# Patient Record
Sex: FEMALE | Race: WHITE | NOT HISPANIC OR LATINO | URBAN - METROPOLITAN AREA
[De-identification: names, ages, dates, MRNs, and addresses within clinical notes are randomized per-mention and may not be internally consistent; named-entity substitution may affect disease eponyms.]

---

## 2021-05-17 ENCOUNTER — OUTPATIENT (OUTPATIENT)
Dept: OUTPATIENT SERVICES | Facility: HOSPITAL | Age: 66
LOS: 1 days | End: 2021-05-17

## 2021-06-04 ENCOUNTER — APPOINTMENT (OUTPATIENT)
Dept: DISASTER EMERGENCY | Facility: CLINIC | Age: 66
End: 2021-06-04

## 2021-06-07 ENCOUNTER — INPATIENT (INPATIENT)
Facility: HOSPITAL | Age: 66
LOS: 1 days | Discharge: HOME HEALTH | End: 2021-06-09

## 2021-06-07 ENCOUNTER — OUTPATIENT (OUTPATIENT)
Dept: OUTPATIENT SERVICES | Facility: HOSPITAL | Age: 66
LOS: 1 days | End: 2021-06-07

## 2021-06-08 ENCOUNTER — OUTPATIENT (OUTPATIENT)
Dept: OUTPATIENT SERVICES | Facility: HOSPITAL | Age: 66
LOS: 1 days | End: 2021-06-08

## 2021-06-09 ENCOUNTER — OUTPATIENT (OUTPATIENT)
Dept: OUTPATIENT SERVICES | Facility: HOSPITAL | Age: 66
LOS: 1 days | End: 2021-06-09

## 2022-05-20 PROBLEM — Z00.00 ENCOUNTER FOR PREVENTIVE HEALTH EXAMINATION: Status: ACTIVE | Noted: 2022-05-20

## 2023-01-10 ENCOUNTER — OFFICE VISIT (OUTPATIENT)
Dept: FAMILY MEDICINE CLINIC | Facility: CLINIC | Age: 68
End: 2023-01-10
Payer: MEDICARE

## 2023-01-10 VITALS
SYSTOLIC BLOOD PRESSURE: 140 MMHG | HEIGHT: 69 IN | WEIGHT: 211 LBS | TEMPERATURE: 97.7 F | BODY MASS INDEX: 31.25 KG/M2 | OXYGEN SATURATION: 96 % | DIASTOLIC BLOOD PRESSURE: 80 MMHG | HEART RATE: 77 BPM

## 2023-01-10 DIAGNOSIS — I10 PRIMARY HYPERTENSION: ICD-10-CM

## 2023-01-10 DIAGNOSIS — E03.9 HYPOTHYROIDISM, UNSPECIFIED TYPE: ICD-10-CM

## 2023-01-10 DIAGNOSIS — M70.71 BURSITIS OF RIGHT HIP, UNSPECIFIED BURSA: ICD-10-CM

## 2023-01-10 DIAGNOSIS — D64.9 ANEMIA, UNSPECIFIED TYPE: ICD-10-CM

## 2023-01-10 DIAGNOSIS — M25.50 ARTHRALGIA, UNSPECIFIED JOINT: ICD-10-CM

## 2023-01-10 DIAGNOSIS — Z87.42 HISTORY OF ABNORMAL CERVICAL PAP SMEAR: ICD-10-CM

## 2023-01-10 DIAGNOSIS — E78.5 HYPERLIPIDEMIA, UNSPECIFIED HYPERLIPIDEMIA TYPE: ICD-10-CM

## 2023-01-10 DIAGNOSIS — L98.9 SKIN LESIONS: ICD-10-CM

## 2023-01-10 DIAGNOSIS — K21.9 GASTROESOPHAGEAL REFLUX DISEASE WITHOUT ESOPHAGITIS: ICD-10-CM

## 2023-01-10 DIAGNOSIS — Z76.89 ENCOUNTER TO ESTABLISH CARE: Primary | ICD-10-CM

## 2023-01-10 DIAGNOSIS — I25.119 CORONARY ARTERY DISEASE INVOLVING NATIVE HEART WITH ANGINA PECTORIS, UNSPECIFIED VESSEL OR LESION TYPE: ICD-10-CM

## 2023-01-10 DIAGNOSIS — E11.9 TYPE 2 DIABETES MELLITUS WITHOUT COMPLICATION, WITHOUT LONG-TERM CURRENT USE OF INSULIN: ICD-10-CM

## 2023-01-10 DIAGNOSIS — C85.91 NON-HODGKIN LYMPHOMA OF LYMPH NODES OF HEAD, UNSPECIFIED NON-HODGKIN LYMPHOMA TYPE: ICD-10-CM

## 2023-01-10 PROCEDURE — 99214 OFFICE O/P EST MOD 30 MIN: CPT | Performed by: STUDENT IN AN ORGANIZED HEALTH CARE EDUCATION/TRAINING PROGRAM

## 2023-01-10 RX ORDER — PANTOPRAZOLE SODIUM 40 MG/1
40 TABLET, DELAYED RELEASE ORAL DAILY
Qty: 90 TABLET | Refills: 3 | Status: SHIPPED | OUTPATIENT
Start: 2023-01-10

## 2023-01-10 RX ORDER — DICLOFENAC SODIUM 30 MG/G
GEL TOPICAL 2 TIMES DAILY
COMMUNITY

## 2023-01-10 NOTE — PROGRESS NOTES
Chief Complaint  [facility] Care and Med Refill    Subjective        Dorothy J Zawada presents to [facility] PRIMARY CARE  History of Present Illness  Lissette is a new patient presenting to [facility].  She moved here 11/2022 from [facility].  She has a number of medical concerns, none acute today.  She provides a full list of her medical history, typed, on 3 pages.  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  Family history of breast cancer in aunt, and grandmother with colon cancer.  She is a former smoker.  She is not currently sexually active.  She lives in a home with her daughter, sister-in-law, niece, and granddaughter, and feels safe there.    Objective   Vital Signs:  /80 (BP Location: Left arm)   Pulse 77   Temp 97.7 °F (36.5 °C)   Ht 175.3 cm (69\")   Wt 95.7 kg (211 lb)   SpO2 96%   BMI 31.16 kg/m²   Estimated body mass index is 31.16 kg/m² as calculated from the following:    Height as of this encounter: 175.3 cm (69\").    Weight as of this encounter: 95.7 kg (211 lb).             Physical Exam  Vitals and nursing note reviewed.   Constitutional:       General: She is not in acute distress.     Appearance: Normal appearance.   HENT:      Head: Normocephalic and atraumatic.   Eyes:      Extraocular Movements: Extraocular movements intact.      Conjunctiva/sclera: Conjunctivae normal.   Cardiovascular:      Rate and Rhythm: Normal rate and regular rhythm.      Pulses: Normal pulses.      Heart sounds: Murmur heard.     No friction rub. No gallop.   Pulmonary:      Effort: Pulmonary effort is normal.      Breath sounds: Normal breath sounds. No wheezing, rhonchi or rales.   Abdominal:      General: Bowel sounds are normal. There is no distension.      Palpations: Abdomen is soft.       Tenderness: There is no abdominal tenderness. There is no right CVA tenderness, left CVA tenderness or guarding.   Musculoskeletal:      Right lower leg: No edema.      Left lower leg: No edema.   Skin:     General: Skin is warm and dry.   Neurological:      General: No focal deficit present.      Mental Status: She is alert. Mental status is at baseline.        Result Review :                   Assessment and Plan   Diagnoses and all orders for this visit:    1. Encounter to establish care (Primary)  -     CBC & Differential; Future  -     Comprehensive Metabolic Panel; Future  -     Lipid Panel; Future  -     TSH Rfx On Abnormal To Free T4; Future  -     Magnesium; Future  -     Hemoglobin A1c; Future  -     Microalbumin / Creatinine Urine Ratio - Urine, Clean Catch; Future  -     Microalbumin / Creatinine Urine Ratio - Urine, Clean Catch  -     Hemoglobin A1c  -     Magnesium  -     TSH Rfx On Abnormal To Free T4  -     Lipid Panel  -     Comprehensive Metabolic Panel  -     CBC & Differential    2. Coronary artery disease involving native heart with angina pectoris, unspecified vessel or lesion type (HCC)  -     apixaban (ELIQUIS) 5 MG tablet tablet; Take 1 tablet by mouth Every 12 (Twelve) Hours.  Dispense: 180 tablet; Refill: 3    3. Primary hypertension    4. Hyperlipidemia, unspecified hyperlipidemia type  -     CBC & Differential; Future  -     Comprehensive Metabolic Panel; Future  -     Lipid Panel; Future  -     TSH Rfx On Abnormal To Free T4; Future  -     Magnesium; Future  -     Hemoglobin A1c; Future  -     Microalbumin / Creatinine Urine Ratio - Urine, Clean Catch; Future  -     Microalbumin / Creatinine Urine Ratio - Urine, Clean Catch  -     Hemoglobin A1c  -     Magnesium  -     TSH Rfx On Abnormal To Free T4  -     Lipid Panel  -     Comprehensive Metabolic Panel  -     CBC & Differential    5. Hypothyroidism, unspecified type  -     TSH Rfx On Abnormal To Free T4; Future  -     TSH Rfx On  Abnormal To Free T4    6. Type 2 diabetes mellitus without complication, without long-term current use of insulin (HCC)  -     CBC & Differential; Future  -     Comprehensive Metabolic Panel; Future  -     Lipid Panel; Future  -     TSH Rfx On Abnormal To Free T4; Future  -     Magnesium; Future  -     Hemoglobin A1c; Future  -     Microalbumin / Creatinine Urine Ratio - Urine, Clean Catch; Future  -     Microalbumin / Creatinine Urine Ratio - Urine, Clean Catch  -     Hemoglobin A1c  -     Magnesium  -     TSH Rfx On Abnormal To Free T4  -     Lipid Panel  -     Comprehensive Metabolic Panel  -     CBC & Differential    7. Skin lesions    8. Arthralgia, unspecified joint  -     traMADol-acetaminophen (ULTRACET) 37.5-325 MG per tablet; Take 1 tablet by mouth Every 6 (Six) Hours As Needed for Moderate Pain.  Dispense: 180 tablet; Refill: 0    9. Bursitis of right hip, unspecified bursa    10. Non-Hodgkin lymphoma of lymph nodes of head, unspecified non-Hodgkin lymphoma type (HCC)    11. Anemia, unspecified type    12. Gastroesophageal reflux disease without esophagitis  -     pantoprazole (Protonix) 40 MG EC tablet; Take 1 tablet by mouth Daily.  Dispense: 90 tablet; Refill: 3    13. History of abnormal cervical Pap smear    Lissette is a new patient presenting to establish care.  She provides a full list of her medical history, typed, on 3 pages.  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      CAD with stents, s/p pacemaker, HTN, HLD: Patient has been following with cardiology in [place].  She reports needing regular pacemaker interrogation.  Referral to cardiology for further management due to extensive history of cardiac issues.  Ordering CBC, CMP, TSH, lipids in clinic today for further evaluation.  Continue current Eliquis, Lopressor,  Norvasc.    T2DM, hypothyroidism: Patient reports reduced urinary output on 1000 mg metformin twice daily, so she switched to half that dosage.  Reports better urinary output at this point.  Also taking Januvia.  Reports last A1c was 6.7 last year.  Patient also reports history of hypothyroidism.  She specifically requests referral to endocrinology for management of these conditions.  She has seen podiatry today.  Ordering CMP, TSH, A1c, microalbumin labs today.  Also providing ophthalmology referral, patient also states she has glaucoma.    Skin lesions: Multiple facial macular noted today.  Patient reports history of Mohs surgery for basal cell carcinoma.  Referral to dermatology for further evaluation and management.    Arthralgias, bursitis: Patient reports longstanding issues, has been getting injections for bursitis in the past through Ortho.  Also continuing to have multiple joint pain including bilateral knee pain following joint replacements, also bilateral wrist pain.  Referral to Ortho for further management.  Patient reports worsening pain in her joints and hip and reports getting relief from tramadol in the past.  Prescribing a course of tramadol as needed until she can follow-up with Ortho for further management.  We will consider referral to pain management and long-term pending Ortho work-up.  Limited options for pain management due to history of acid reflux.    Non-Hodgkin's lymphoma/anemia: S/p excision and radiation.  Has been following with heme-onc in [place].  Providing referral to heme-onc for further management.    Acid reflux: Also history of gastric bypass.  Continue current Protonix.    History of abnormal PAP: Reports not up-to-date on cervical cancer screening.  Hx cervical bx in 2015.    Preventative: Further review of screening, immunizations at future visit.       Follow Up   Return in about 4 weeks (around 2/7/2023) for Annual physical, without pap, controlled substance  contract.  Patient was given instructions and counseling regarding her condition or for health maintenance advice. Please see specific information pulled into the AVS if appropriate.

## 2023-01-12 PROBLEM — Z95.0 PRESENCE OF CARDIAC PACEMAKER: Status: ACTIVE | Noted: 2023-01-12

## 2023-01-12 PROBLEM — I48.0 PAF (PAROXYSMAL ATRIAL FIBRILLATION): Status: ACTIVE | Noted: 2023-01-12

## 2023-01-12 LAB
ALBUMIN SERPL-MCNC: 4.1 G/DL (ref 3.8–4.8)
ALBUMIN/GLOB SERPL: 1.9 {RATIO} (ref 1.2–2.2)
ALP SERPL-CCNC: 64 IU/L (ref 44–121)
ALT SERPL-CCNC: 4 IU/L (ref 0–32)
AST SERPL-CCNC: 17 IU/L (ref 0–40)
BASOPHILS # BLD AUTO: 0.1 X10E3/UL (ref 0–0.2)
BASOPHILS NFR BLD AUTO: 1 %
BILIRUB SERPL-MCNC: 0.4 MG/DL (ref 0–1.2)
BUN SERPL-MCNC: 11 MG/DL (ref 8–27)
BUN/CREAT SERPL: 14 (ref 12–28)
CALCIUM SERPL-MCNC: 9.1 MG/DL (ref 8.7–10.3)
CHLORIDE SERPL-SCNC: 105 MMOL/L (ref 96–106)
CHOLEST SERPL-MCNC: 130 MG/DL (ref 100–199)
CO2 SERPL-SCNC: 23 MMOL/L (ref 20–29)
CREAT SERPL-MCNC: 0.77 MG/DL (ref 0.57–1)
CREAT UR-MCNC: NORMAL MG/DL
EGFRCR SERPLBLD CKD-EPI 2021: 84 ML/MIN/1.73
EOSINOPHIL # BLD AUTO: 0.2 X10E3/UL (ref 0–0.4)
EOSINOPHIL NFR BLD AUTO: 3 %
ERYTHROCYTE [DISTWIDTH] IN BLOOD BY AUTOMATED COUNT: 12.8 % (ref 11.7–15.4)
GLOBULIN SER CALC-MCNC: 2.2 G/DL (ref 1.5–4.5)
GLUCOSE SERPL-MCNC: 133 MG/DL (ref 70–99)
HBA1C MFR BLD: 6.4 % (ref 4.8–5.6)
HCT VFR BLD AUTO: 35.8 % (ref 34–46.6)
HDLC SERPL-MCNC: 56 MG/DL
HGB BLD-MCNC: 11.6 G/DL (ref 11.1–15.9)
IMM GRANULOCYTES # BLD AUTO: 0 X10E3/UL (ref 0–0.1)
IMM GRANULOCYTES NFR BLD AUTO: 0 %
LDLC SERPL CALC-MCNC: 51 MG/DL (ref 0–99)
LYMPHOCYTES # BLD AUTO: 1.5 X10E3/UL (ref 0.7–3.1)
LYMPHOCYTES NFR BLD AUTO: 22 %
MAGNESIUM SERPL-MCNC: 2.1 MG/DL (ref 1.6–2.3)
MCH RBC QN AUTO: 30.9 PG (ref 26.6–33)
MCHC RBC AUTO-ENTMCNC: 32.4 G/DL (ref 31.5–35.7)
MCV RBC AUTO: 96 FL (ref 79–97)
MICROALBUMIN UR-MCNC: NORMAL
MONOCYTES # BLD AUTO: 0.4 X10E3/UL (ref 0.1–0.9)
MONOCYTES NFR BLD AUTO: 5 %
NEUTROPHILS # BLD AUTO: 4.9 X10E3/UL (ref 1.4–7)
NEUTROPHILS NFR BLD AUTO: 69 %
PLATELET # BLD AUTO: 269 X10E3/UL (ref 150–450)
POTASSIUM SERPL-SCNC: 4.1 MMOL/L (ref 3.5–5.2)
PROT SERPL-MCNC: 6.3 G/DL (ref 6–8.5)
RBC # BLD AUTO: 3.75 X10E6/UL (ref 3.77–5.28)
SODIUM SERPL-SCNC: 143 MMOL/L (ref 134–144)
SPECIMEN STATUS: NORMAL
T4 FREE SERPL-MCNC: 2.05 NG/DL (ref 0.82–1.77)
TRIGL SERPL-MCNC: 135 MG/DL (ref 0–149)
TSH SERPL DL<=0.005 MIU/L-ACNC: 0.01 UIU/ML (ref 0.45–4.5)
VLDLC SERPL CALC-MCNC: 23 MG/DL (ref 5–40)
WBC # BLD AUTO: 7.1 X10E3/UL (ref 3.4–10.8)

## 2023-01-13 ENCOUNTER — OFFICE VISIT (OUTPATIENT)
Dept: CARDIOLOGY | Facility: CLINIC | Age: 68
End: 2023-01-13
Payer: MEDICARE

## 2023-01-13 VITALS
HEIGHT: 69 IN | OXYGEN SATURATION: 97 % | SYSTOLIC BLOOD PRESSURE: 128 MMHG | RESPIRATION RATE: 18 BRPM | TEMPERATURE: 96.6 F | HEART RATE: 91 BPM | BODY MASS INDEX: 31.25 KG/M2 | WEIGHT: 211 LBS | DIASTOLIC BLOOD PRESSURE: 80 MMHG

## 2023-01-13 DIAGNOSIS — I10 PRIMARY HYPERTENSION: ICD-10-CM

## 2023-01-13 DIAGNOSIS — I48.0 PAF (PAROXYSMAL ATRIAL FIBRILLATION): ICD-10-CM

## 2023-01-13 DIAGNOSIS — E11.9 TYPE 2 DIABETES MELLITUS WITHOUT COMPLICATION, WITHOUT LONG-TERM CURRENT USE OF INSULIN: ICD-10-CM

## 2023-01-13 DIAGNOSIS — E78.5 HYPERLIPIDEMIA, UNSPECIFIED HYPERLIPIDEMIA TYPE: ICD-10-CM

## 2023-01-13 DIAGNOSIS — I25.119 CORONARY ARTERY DISEASE INVOLVING NATIVE HEART WITH ANGINA PECTORIS, UNSPECIFIED VESSEL OR LESION TYPE: Primary | ICD-10-CM

## 2023-01-13 DIAGNOSIS — Z95.0 PRESENCE OF CARDIAC PACEMAKER: ICD-10-CM

## 2023-01-13 PROCEDURE — 93000 ELECTROCARDIOGRAM COMPLETE: CPT

## 2023-01-13 PROCEDURE — 99214 OFFICE O/P EST MOD 30 MIN: CPT

## 2023-01-13 RX ORDER — LEVOTHYROXINE SODIUM 0.15 MG/1
150 TABLET ORAL DAILY
COMMUNITY
End: 2023-01-17

## 2023-01-13 RX ORDER — ROSUVASTATIN CALCIUM 20 MG/1
20 TABLET, COATED ORAL DAILY
COMMUNITY
End: 2023-03-13 | Stop reason: SDUPTHER

## 2023-01-13 NOTE — PROGRESS NOTES
[facility]  [facility] CARDIOLOGY  [address] [place] [address]  Dept: [phone]  Dept Fax: [phone]    Dorothy J Zawada  1955    New Patient Office Note    History of Present Illness:  Dorothy J Zawada is a 67 y.o. female who presents to the clinic for Establish Care. She has PMH significant for GERD, hypothyroid, non-hodgekins lymphoma, DM, gastric bypass, HTN, CAD, HLP, PPM, Anemia. She is , 3 children. Retired. She denies Etoh, drugs, former smoker quit 1996, 2.5 ppd x 20 yrs. Denies caffeine. Low salt, low carb diet. Family history: Mother- PPM, CABG 60's, carotid endarterectomy, smoker, DM. Father-HTN, HLP, CABG-60's, heart failure. Brother- PPM.     She is a pleasant 67 yr old who presents today to [facility] care. She denies cardiac complaints of CP, SOB, dizziness, LE edema, orthopnea, PND. She does feel she has been fatigued, chronic for 2 years, notable to have anemia, also thyroid abnormalities. Occasionally notices palpitations 1-2 x weekly only lasting seconds, she has known PAF, on Eliquis 5 bid and Metoprolol 12.5 bid, chadsvasc 6, denies bleeding. HTN on metoprolol 12.5 BP is good 130/80 on recheck today. HLP, CAD, DM. PPM medtronic placed May 2022, no interrogation, will schedule for Jan 27th 2:30. Ekg today Atrial Paced, Hr 66 RBBB no previous to compare. PE today seems normal, notable grade II URSB murmur, she states has been there and she has leaky valve. She did say she has had a recent workup Echo, stress testing and states to be normal. We do not have any formal reports at this time. Has been requested from [place]. Once available will review.     The following portions of the patient's history were reviewed and updated as appropriate: allergies, current medications, past family history, past medical history, past social history, past surgical history, and problem list.    Medications:  acetaminophen  ALPRAZolam  apixaban  tablet  Diclofenac Sodium gel  levothyroxine  metFORMIN  metoprolol tartrate  mometasone  pantoprazole  rosuvastatin  SITagliptin  traMADol-acetaminophen    Subjective  Allergies   Allergen Reactions   • Claritin [Loratadine] Palpitations   • Fish-Derived Products GI Intolerance   • Zocor [Simvastatin] Myalgia   • Allegra [Fexofenadine] Palpitations   • Pseudoephedrine Palpitations   • Wool Alcohol [Lanolin] Rash   • Zithromax [Azithromycin] Rash   • Zyrtec [Cetirizine] Palpitations        Past Medical History:   Diagnosis Date   • Anemia    • Arthritis    • Carcinoma (HCC)     Basal Cell/Squamous Cell   • Coronary artery calcification    • Diabetes mellitus (HCC)    • Disease of thyroid gland    • Hypertension    • Hypothyroidism    • Mantoux: positive    • Osteopenia    • Plantar fasciitis    • Ulcers of both great toes (HCC)        Past Surgical History:   Procedure Laterality Date   • ANGIOPLASTY     • ANGIOPLASTY     •  SECTION     • DENTAL PROCEDURE     • FOOT SURGERY Left    • GASTRIC BYPASS     • HERNIA REPAIR     • KNEE SURGERY Left     Total Knee   • LUNG REMOVAL, PARTIAL     • MOHS SURGERY N/A     Face and head   • PACEMAKER IMPLANTATION     • REPLACEMENT TOTAL KNEE Bilateral    • TONSILLECTOMY         History reviewed. No pertinent family history.     Social History     Socioeconomic History   • Marital status: Legally    Tobacco Use   • Smoking status: Former   • Smokeless tobacco: Never   Substance and Sexual Activity   • Alcohol use: Defer   • Drug use: Defer   • Sexual activity: Defer       Review of Systems   Constitutional: Positive for fatigue.   All other systems reviewed and are negative.      Cardiovascular Procedures    ECHO/MUGA:   STRESS TESTS:   CARDIAC CATH:   DEVICES:   HOLTER:   CT/MRI:   VASCULAR:   CARDIOTHORACIC:     Objective  Vitals:    23 1252   BP: 128/80   BP Location: Right arm   Patient Position: Sitting   Pulse: 91   Resp: 18   Temp:  "96.6 °F (35.9 °C)   TempSrc: Infrared   SpO2: 97%   Weight: 95.7 kg (211 lb)   Height: 175.3 cm (69\")   PainSc: 0-No pain       Physical Exam  Vitals reviewed.   Constitutional:       Appearance: Healthy appearance. Not in distress.   Neck:      Vascular: No JVR. JVD normal.   Pulmonary:      Effort: Pulmonary effort is normal.      Breath sounds: Normal breath sounds. No wheezing. No rhonchi. No rales.   Chest:      Chest wall: Not tender to palpatation.   Cardiovascular:      PMI at left midclavicular line. Normal rate. Regular rhythm. Normal S1. Normal S2.      Murmurs: There is a grade 2/6 systolic murmur at the URSB.      No gallop. No click. No rub.   Pulses:     Intact distal pulses.   Edema:     Peripheral edema absent.   Abdominal:      General: Bowel sounds are normal.      Palpations: Abdomen is soft.      Tenderness: There is no abdominal tenderness.   Musculoskeletal: Normal range of motion.         General: No tenderness. Skin:     General: Skin is warm and dry.   Neurological:      General: No focal deficit present.      Mental Status: Alert and oriented to person, place and time.          Diagnostic Data    ECG 12 Lead    Date/Time: 1/13/2023 1:59 PM  Performed by: Sarah Thomas APRN  Authorized by: Sarah Thomas APRN   Comparison: not compared with previous ECG   Previous ECG: no previous ECG available  Rhythm: paced  Rate: normal  BPM: 66  Conduction: right bundle branch block  QRS axis: normal    Clinical impression: non-specific ECG            Assessment and Plan  Diagnoses and all orders for this visit:    1. Coronary artery disease involving native heart with angina pectoris, unspecified vessel or lesion type (HCC) (Primary)  Per patient PCI- 1996 and also 2009, no record available. Withholding ASA, mild anemia she states she has had iron infusion 2022. No CP,SOB. Ekg is good.     2. PAF (paroxysmal atrial fibrillation) (HCC)  On Eliquis 5 mg bid and metoprolol 12.5 bid. " Hr today 66, a-paced by ekg. Denies bleeding. Rare palpitations, lasting seconds. Continue current therapy.     3. Primary hypertension  On metoprolol 12.5 bid, BP today 130/80 on recheck. She is on low salt diet. Continue current therapy.     4. Hyperlipidemia, unspecified hyperlipidemia type  On Crestor 20 mg qd, LDL 51, trigs 135 Jan 2023. At goal. Continue current therapy.     5. Type 2 diabetes mellitus without complication, without long-term current use of insulin ([facility])  A1C 6.4, at goal. Managed by PCP.     6. Presence of cardiac pacemaker  Medtronic per her report was placed May 2022. Coming for interrogation Jan 27th 2022.         Return in about 6 months (around 7/13/2023) for Brandy Hollis.    YOVANI Jean-Baptiste  01/13/2023

## 2023-01-17 ENCOUNTER — TELEPHONE (OUTPATIENT)
Dept: CARDIOLOGY | Facility: CLINIC | Age: 68
End: 2023-01-17
Payer: MEDICARE

## 2023-01-17 RX ORDER — LEVOTHYROXINE SODIUM 137 UG/1
137 TABLET ORAL DAILY
Qty: 90 TABLET | Refills: 3 | Status: SHIPPED | OUTPATIENT
Start: 2023-01-25 | End: 2023-03-06 | Stop reason: DRUGHIGH

## 2023-01-17 NOTE — TELEPHONE ENCOUNTER
----- Message from YOVANI Jean-Baptiste sent at 1/12/2023  5:13 PM EST -----  This is a new patient referred by Dr. Rashid, she has extensive cardiac history with CAD, PPM, atrial fibrillation but I have no cardiac records on her chart. Can we try to figure out where to retrieve these from?  Thanks :)

## 2023-01-23 ENCOUNTER — OFFICE VISIT (OUTPATIENT)
Dept: ORTHOPEDIC SURGERY | Facility: CLINIC | Age: 68
End: 2023-01-23
Payer: MEDICARE

## 2023-01-23 ENCOUNTER — PATIENT ROUNDING (BHMG ONLY) (OUTPATIENT)
Dept: ORTHOPEDIC SURGERY | Facility: CLINIC | Age: 68
End: 2023-01-23
Payer: MEDICARE

## 2023-01-23 VITALS
HEART RATE: 84 BPM | BODY MASS INDEX: 31.58 KG/M2 | SYSTOLIC BLOOD PRESSURE: 147 MMHG | HEIGHT: 69 IN | DIASTOLIC BLOOD PRESSURE: 85 MMHG | WEIGHT: 213.2 LBS

## 2023-01-23 DIAGNOSIS — M70.61 TROCHANTERIC BURSITIS OF RIGHT HIP: Primary | ICD-10-CM

## 2023-01-23 PROCEDURE — 20610 DRAIN/INJ JOINT/BURSA W/O US: CPT | Performed by: INTERNAL MEDICINE

## 2023-01-23 PROCEDURE — 73502 X-RAY EXAM HIP UNI 2-3 VIEWS: CPT | Performed by: INTERNAL MEDICINE

## 2023-01-23 PROCEDURE — 99203 OFFICE O/P NEW LOW 30 MIN: CPT | Performed by: INTERNAL MEDICINE

## 2023-01-23 RX ORDER — TRIAMCINOLONE ACETONIDE 40 MG/ML
80 INJECTION, SUSPENSION INTRA-ARTICULAR; INTRAMUSCULAR
Status: COMPLETED | OUTPATIENT
Start: 2023-01-23 | End: 2023-01-23

## 2023-01-23 RX ORDER — LIDOCAINE HYDROCHLORIDE 10 MG/ML
4 INJECTION, SOLUTION EPIDURAL; INFILTRATION; INTRACAUDAL; PERINEURAL
Status: COMPLETED | OUTPATIENT
Start: 2023-01-23 | End: 2023-01-23

## 2023-01-23 RX ORDER — SERTRALINE HYDROCHLORIDE 100 MG/1
TABLET, FILM COATED ORAL
COMMUNITY
Start: 2022-01-01 | End: 2023-03-13 | Stop reason: SDUPTHER

## 2023-01-23 RX ADMIN — LIDOCAINE HYDROCHLORIDE 4 ML: 10 INJECTION, SOLUTION EPIDURAL; INFILTRATION; INTRACAUDAL; PERINEURAL at 10:21

## 2023-01-23 RX ADMIN — TRIAMCINOLONE ACETONIDE 80 MG: 40 INJECTION, SUSPENSION INTRA-ARTICULAR; INTRAMUSCULAR at 10:21

## 2023-01-23 NOTE — PROGRESS NOTES
"Subjective:     Patient ID: Dorothy J Zawada is a 67 y.o. female.    Chief Complaint:    History of Present Illness  Dorothy J Zawada presents to clinic today for evaluation of right hip bursitis.  The patient recently moved to [facility] from [place].  She was being treated in ER for right hip greater trochanteric bursitis.  Unfortunately the patient states she is not able to take NSAIDs and has been managing her bursal pain with serial corticosteroid injections.  The patient states that her last injection into her right hip bursa was in July.  She says that usually she gets multiple months of pain relief with these injections.  She says she thinks it flared up during the move and because she has to go up and down more stairs at her new house.     Social History     Occupational History   • Not on file   Tobacco Use   • Smoking status: Former   • Smokeless tobacco: Never   Substance and Sexual Activity   • Alcohol use: Defer   • Drug use: Defer   • Sexual activity: Defer      Past Medical History:   Diagnosis Date   • Anemia    • Arthritis    • Carcinoma (HCC)     Basal Cell/Squamous Cell   • Coronary artery calcification    • Diabetes mellitus (HCC)    • Disease of thyroid gland    • Hypertension    • Hypothyroidism    • Mantoux: positive    • Osteopenia    • Plantar fasciitis    • Ulcers of both great toes (HCC)      Past Surgical History:   Procedure Laterality Date   • ANGIOPLASTY     • ANGIOPLASTY     •  SECTION     • DENTAL PROCEDURE     • FOOT SURGERY Left    • GASTRIC BYPASS     • HERNIA REPAIR     • KNEE SURGERY Left     Total Knee   • LUNG REMOVAL, PARTIAL     • MOHS SURGERY N/A     Face and head   • PACEMAKER IMPLANTATION     • REPLACEMENT TOTAL KNEE Bilateral    • TONSILLECTOMY         History reviewed. No pertinent family history.              Objective:  Vitals:    23 0954   BP: 147/85   Pulse: 84   Weight: 96.7 kg (213 lb 3.2 oz)   Height: 175.3 cm (69\")      "    01/23/23  0954   Weight: 96.7 kg (213 lb 3.2 oz)     Body mass index is 31.48 kg/m².        Right Hip Exam     Tenderness   The patient is experiencing tenderness in the greater trochanter.    Range of Motion   Flexion: normal   External rotation: normal   Internal rotation: normal     Muscle Strength   Flexion: 5/5     Tests   TEODORO: negative  Fadir:  Negative FADIR test    Other   Erythema: absent  Scars: absent  Sensation: normal  Pulse: present    Comments:  Exquisite point tenderness over greater trochanter.               Imaging: 3 standing views of the right hip and pelvis was ordered and reviewed by myself in the office today  Indication: Right hip pain  Findings: X-rays demonstrate bilateral hip mild to moderate osteoarthritis with joint space narrowing and sclerosis without significant osteophyte formation.  No signs of fracture dislocation or subluxation.  No acute osseous abnormality  Comparative studies: None    Assessment:        1. Trochanteric bursitis of right hip           Plan:  Large Joint Arthrocentesis: R greater trochanteric bursa  Date/Time: 1/23/2023 10:21 AM  Consent given by: patient  Site marked: site marked  Timeout: Immediately prior to procedure a time out was called to verify the correct patient, procedure, equipment, support staff and site/side marked as required   Supporting Documentation  Indications: pain   Procedure Details  Location: hip - R greater trochanteric bursa  Preparation: Patient was prepped and draped in the usual sterile fashion  Needle size: 22 G (SPINAL)  Approach: lateral  Medications administered: 80 mg triamcinolone acetonide 40 MG/ML; 4 mL lidocaine PF 1% 1 %  Patient tolerance: patient tolerated the procedure well with no immediate complications                1. Discussed treatment options at length with patient at today's visit.  I discussed with the patient that the mainstays of treatment for hip bursitis are corticosteroid injections, physical therapy,  activity modification, nonsteroidal anti-inflammatories, and topical creams.  She states she is unable to take NSAIDs and would like to try another injection today since the last 1 in July lasted quite a few months.  I discussed with the patient that she may get hip injections every 3 months but not sooner.  I discussed with her today that after her injection she does not need to schedule follow-up with me but I am happy to see her back at any point time if issues arise.  2. Follow up: As needed      Dorothy J Zawada was in agreement with plan and had all questions answered.     Medications:  No orders of the defined types were placed in this encounter.      Followup:  No follow-ups on file.    Diagnoses and all orders for this visit:    1. Trochanteric bursitis of right hip (Primary)  -     XR Hip With or Without Pelvis 2 - 3 View Right  -     Large Joint Arthrocentesis: R greater trochanteric bursa          Dictated utilizing Dragon dictation

## 2023-01-23 NOTE — PROGRESS NOTES
January 23, 2023    Hello, may I speak with Dorothy J Zawada?    My name is TONO      I am  with [facility]  [address]  [place] [address] [phone].    Before we get started may I verify your date of birth? 1955    I am calling to officially welcome you to our practice and ask about your recent visit. Is this a good time to talk? YES    Tell me about your visit with us. What things went well?  DR. BURK WAS VERY NICE AND WAS ABLE TO SQUEEZE ME IN.        We're always looking for ways to make our patients' experiences even better. Do you have recommendations on ways we may improve?  THE WAIT WAS A LITTLE LONG.     Overall were you satisfied with your first visit to our practice? YES       I appreciate you taking the time to speak with me today. Is there anything else I can do for you? NO      Thank you, and have a great day.

## 2023-01-27 ENCOUNTER — CLINICAL SUPPORT NO REQUIREMENTS (OUTPATIENT)
Dept: CARDIOLOGY | Facility: CLINIC | Age: 68
End: 2023-01-27
Payer: MEDICARE

## 2023-01-27 DIAGNOSIS — Z95.0 CARDIAC PACEMAKER IN SITU: Primary | ICD-10-CM

## 2023-01-27 PROCEDURE — 93280 PM DEVICE PROGR EVAL DUAL: CPT | Performed by: INTERNAL MEDICINE

## 2023-02-07 ENCOUNTER — OFFICE VISIT (OUTPATIENT)
Dept: FAMILY MEDICINE CLINIC | Facility: CLINIC | Age: 68
End: 2023-02-07
Payer: MEDICARE

## 2023-02-07 VITALS
SYSTOLIC BLOOD PRESSURE: 130 MMHG | TEMPERATURE: 98.2 F | BODY MASS INDEX: 31.28 KG/M2 | HEART RATE: 83 BPM | HEIGHT: 69 IN | DIASTOLIC BLOOD PRESSURE: 70 MMHG | OXYGEN SATURATION: 97 % | WEIGHT: 211.2 LBS

## 2023-02-07 DIAGNOSIS — E03.9 HYPOTHYROIDISM, UNSPECIFIED TYPE: ICD-10-CM

## 2023-02-07 DIAGNOSIS — F41.9 ANXIETY: ICD-10-CM

## 2023-02-07 DIAGNOSIS — M25.50 ARTHRALGIA, UNSPECIFIED JOINT: Primary | ICD-10-CM

## 2023-02-07 DIAGNOSIS — R19.5 DARK STOOLS: ICD-10-CM

## 2023-02-07 DIAGNOSIS — G47.00 INSOMNIA, UNSPECIFIED TYPE: ICD-10-CM

## 2023-02-07 PROCEDURE — 99214 OFFICE O/P EST MOD 30 MIN: CPT | Performed by: STUDENT IN AN ORGANIZED HEALTH CARE EDUCATION/TRAINING PROGRAM

## 2023-02-07 RX ORDER — ALPRAZOLAM 0.5 MG/1
0.5 TABLET ORAL 2 TIMES DAILY PRN
Qty: 30 TABLET | Refills: 3 | Status: SHIPPED | OUTPATIENT
Start: 2023-02-07 | End: 2023-03-13 | Stop reason: SDUPTHER

## 2023-02-07 NOTE — PROGRESS NOTES
"Chief Complaint  Follow-up (Dark stools, lab review.) and Joint Pain    Subjective        Dorothy J Zawada presents to [facility]  History of Present Illness  Lissette is an established patient presenting for arthralgias, fatigue, lab review.  She reports worsening pain in all of her joints since moving to this area over the past few months.  She has been off of her regular medication routine and was past due for her hip injection.  She had started taking ibuprofen and noted dark stools.  She discontinued ibuprofen after getting her hip injection and dark stools resolved.  She also reports discontinuing Protonix due to some confusion over medication interactions, but has restarted this.  She still reports arthralgias which she has managed in the past with tramadol and Xanax to help calm down at bedtime.  This has worked well for her in the past.  She also reports medical marijuana was working well for her when she was living in [place].  She acknowledges not always having the best balance when she is walking, but in general feels steady on her feet and denies lightheadedness when she stands.  She reports when she tries to sleep she has difficulty falling asleep due to anxiety and pain and then usually awakens after about 4 hours.  She does keep her room cool and dark, although there is some noise from adjacent rooms.  Even though she has some tiredness during the day, naps occasionally, she denies any family members telling her she snores or gasps for air when she sleeps.  She gets exercise around the house daily, walking up and down 3 flights of stairs, and is trying to find a yoga class.  She also asks about her lab work due to recent change in her thyroid level and also is concerned about anemia because she notes she is starting to chew ice cubes.      Objective   Vital Signs:  /70   Pulse 83   Temp 98.2 °F (36.8 °C)   Ht 175.3 cm (69\")   Wt 95.8 kg (211 lb 3.2 oz)   " "SpO2 97%   BMI 31.19 kg/m²   Estimated body mass index is 31.19 kg/m² as calculated from the following:    Height as of this encounter: 175.3 cm (69\").    Weight as of this encounter: 95.8 kg (211 lb 3.2 oz).             Physical Exam  Vitals reviewed.   Constitutional:       General: She is not in acute distress.     Appearance: Normal appearance.   HENT:      Head: Normocephalic and atraumatic.   Eyes:      Extraocular Movements: Extraocular movements intact.      Conjunctiva/sclera: Conjunctivae normal.   Cardiovascular:      Rate and Rhythm: Normal rate and regular rhythm.      Heart sounds: Normal heart sounds. No murmur heard.    No friction rub. No gallop.   Pulmonary:      Effort: Pulmonary effort is normal.      Breath sounds: Normal breath sounds. No wheezing, rhonchi or rales.   Abdominal:      Tenderness: There is no right CVA tenderness or left CVA tenderness.   Musculoskeletal:      Right lower leg: No edema.      Left lower leg: No edema.   Skin:     General: Skin is warm and dry.      Capillary Refill: Capillary refill takes less than 2 seconds.   Neurological:      General: No focal deficit present.      Mental Status: She is alert. Mental status is at baseline.   Psychiatric:         Mood and Affect: Mood normal.         Behavior: Behavior normal.        Result Review :                   Assessment and Plan   Diagnoses and all orders for this visit:    1. Arthralgia, unspecified joint (Primary)  -     traMADol-acetaminophen (ULTRACET) 37.5-325 MG per tablet; Take 1 tablet by mouth At Night As Needed for Moderate Pain.  Dispense: 90 tablet; Refill: 0    2. Dark stools    3. Insomnia, unspecified type  -     ALPRAZolam (Xanax) 0.5 MG tablet; Take 1 tablet by mouth 2 (Two) Times a Day As Needed for Anxiety.  Dispense: 30 tablet; Refill: 3    4. Anxiety  -     ALPRAZolam (Xanax) 0.5 MG tablet; Take 1 tablet by mouth 2 (Two) Times a Day As Needed for Anxiety.  Dispense: 30 tablet; Refill: 3    5. " Hypothyroidism, unspecified type    Lissette is an established patient presenting for arthralgias, fatigue, lab review.      Arthralgias: Longstanding symptoms consistent with osteoarthritis.  NSAIDs have worked well, however, are contraindicated due to other medications.  Patient gets relief with tramadol which helps her sleep at night.  Continuing tramadol.  Pe[place] reviewed.  Patient agreeable to clinic controlled substance contract.    Dark stools: Patient reports symptoms resolved after stopping ibuprofen.  Review of records indicate past colonoscopy 2016.  Follow-up plan for 2026.  We will continue to monitor.  No further work-up at this time.    Insomnia: Patient reports anxiety, pain that affect her ability to fall asleep and stay asleep.  She feels Zoloft is helping her anxiety symptoms but she still has anxiety when going to bed.  Xanax has helped her calm down further to be able to sleep at night.  Continuing at this time.  Discussed with patient long-term goal of reducing/discontinuing this medication.  Discussed ways to improve sleep hygiene including limiting screen time at night and other relaxation techniques.  Patient agreeable to clinic controlled substance contract.  Continuing current Xanax at this time.  Patient will attempt to cut back on use.  Discussed risks related to falls, but patient feels she is steady on her feet in the house and has no trouble when taking this medication.    Anxiety: She feels current Zoloft helps control her anxiety symptoms.  Discussed possibility of increasing/changing this medication to help with her anxiety at night, as well as her pain.  We will discuss further at future visit.  For now, continuing current Zoloft until she is established with her other specialists.    Hypothyroidism: Reviewed 1/11/2023 labs with patient and explained reason for cutting back on her Synthroid dose.  Continue current Synthroid at 137 mcg daily.    Anemia: Patient concerned about  chewing ice recently since the symptom was consistent with her reported previous iron deficiency anemia.  Discussed starting iron supplement orally, however, patient is hesitant to do this due to stomach upset in the past.  She is agreeable to following up for further management by heme-onc who has been scheduled 3/2023.       Follow Up   Return in about 3 months (around 5/7/2023) for Pain, anxiety, sleep.  Patient was given instructions and counseling regarding her condition or for health maintenance advice. Please see specific information pulled into the AVS if appropriate.

## 2023-02-09 ENCOUNTER — OFFICE VISIT (OUTPATIENT)
Dept: OBSTETRICS AND GYNECOLOGY | Age: 68
End: 2023-02-09
Payer: MEDICARE

## 2023-02-09 VITALS
DIASTOLIC BLOOD PRESSURE: 62 MMHG | SYSTOLIC BLOOD PRESSURE: 102 MMHG | BODY MASS INDEX: 30.96 KG/M2 | HEIGHT: 69 IN | WEIGHT: 209 LBS

## 2023-02-09 DIAGNOSIS — Z12.31 SCREENING MAMMOGRAM FOR BREAST CANCER: ICD-10-CM

## 2023-02-09 DIAGNOSIS — Z78.0 POSTMENOPAUSAL: ICD-10-CM

## 2023-02-09 DIAGNOSIS — Z01.419 WELL WOMAN EXAM WITH ROUTINE GYNECOLOGICAL EXAM: Primary | ICD-10-CM

## 2023-02-09 PROCEDURE — G0101 CA SCREEN;PELVIC/BREAST EXAM: HCPCS | Performed by: NURSE PRACTITIONER

## 2023-02-09 NOTE — PROGRESS NOTES
[facility]  Routine Annual Visit    2023    Patient: Dorothy J Zawada          MR#:[id number]      History of Present Illness    67 y.o. female No obstetric history on file. who presents for annual exam as a new patient.    She was referred by her PCP for pap smear due to past history of cervical dysplasia per the patient. Denies any procedures to the cervix. States last pap was about 5 years ago.    Reports last mammogram in - encouraged yearly screenings    Current on colonoscopy.    Postmenopausal- on bleeding. No HRT.     No LMP recorded. Patient is postmenopausal.  Obstetric History:  OB History    No obstetric history on file.        Menstrual History:     No LMP recorded. Patient is postmenopausal.       Sexual History:       ________________________________________  Patient Active Problem List   Diagnosis   • Coronary artery disease involving native heart with angina pectoris (HCC)   • Primary hypertension   • Hyperlipidemia   • Hypothyroidism   • Type 2 diabetes mellitus without complication, without long-term current use of insulin (HCC)   • Bursitis of right hip   • Non-Hodgkin lymphoma of lymph nodes of head (HCC)   • Arthralgia   • Gastroesophageal reflux disease without esophagitis   • PAF (paroxysmal atrial fibrillation) (HCC)   • Presence of cardiac pacemaker       Past Medical History:   Diagnosis Date   • Anemia    • Arthritis    • Carcinoma (HCC)     Basal Cell/Squamous Cell   • Coronary artery calcification    • Diabetes mellitus (HCC)    • Disease of thyroid gland    • Hypertension    • Hypothyroidism    • Mantoux: positive    • Osteopenia    • Plantar fasciitis    • Ulcers of both great toes (HCC)        Past Surgical History:   Procedure Laterality Date   • ANGIOPLASTY     • ANGIOPLASTY     •  SECTION     • DENTAL PROCEDURE     • FOOT SURGERY Left    • GASTRIC BYPASS     • HERNIA REPAIR     • KNEE SURGERY Left     Total Knee   • LUNG REMOVAL, PARTIAL      • MOHS SURGERY N/A     Face and head   • PACEMAKER IMPLANTATION     • REPLACEMENT TOTAL KNEE Bilateral    • TONSILLECTOMY         Social History     Tobacco Use   Smoking Status Former   • Packs/day: 2.50   • Years: 20.00   • Pack years: 50.00   • Types: Cigarettes   • Start date:    • Quit date:    • Years since quittin.1   Smokeless Tobacco Never       Family History   Problem Relation Age of Onset   • Heart disease Mother    • Hypertension Mother    • Diabetes Mother    • Kidney disease Father    • Heart attack Father    • Heart disease Father    • Diabetes Brother    • Hypertension Brother    • Anxiety disorder Daughter    • ADD / ADHD Daughter    • Breast cancer Paternal Aunt    • Tuberculosis Paternal Aunt    • Diabetes Maternal Grandmother    • Colon cancer Maternal Grandmother    • Heart disease Paternal Grandfather        Prior to Admission medications    Medication Sig Start Date End Date Taking? Authorizing Provider   acetaminophen (TYLENOL) 500 MG tablet Take 500 mg by mouth Every 6 (Six) Hours As Needed for Mild Pain .   Yes Emergency, Nurse VON Sam   ALPRAZolam (XANAX) 0.25 MG tablet Take 0.5 mg by mouth Daily.   Yes Emergency, Nurse VON Sam   ALPRAZolam (Xanax) 0.5 MG tablet Take 1 tablet by mouth 2 (Two) Times a Day As Needed for Anxiety. 23  Yes Lenin Rashid MD   apixaban (ELIQUIS) 5 MG tablet tablet Take 1 tablet by mouth Every 12 (Twelve) Hours. 1/10/23  Yes Lenin Rashid MD   Diclofenac Sodium 3 % gel gel Apply  topically to the appropriate area as directed 2 (Two) Times a Day. for 60 days.   Yes Saul Hernandes MD   levothyroxine (SYNTHROID, LEVOTHROID) 137 MCG tablet Take 1 tablet by mouth Daily. 23  Yes Lenin Rashid MD   metFORMIN (GLUCOPHAGE) 500 MG tablet Take 500 mg by mouth 2 (Two) Times a Day With Meals.   Yes Saul Hernandes MD   metoprolol tartrate (LOPRESSOR) 25 MG tablet Take 12.5 mg by mouth 2 (Two) Times a Day.    Yes Emergency, Nurse VON Sam   mometasone (NASONEX) 50 MCG/ACT nasal spray 2 sprays into the nostril(s) as directed by provider Daily.   Yes Emergency, Nurse VON Sam   pantoprazole (Protonix) 40 MG EC tablet Take 1 tablet by mouth Daily. 1/10/23  Yes Lenin Rashid MD   rosuvastatin (CRESTOR) 20 MG tablet Take 20 mg by mouth Daily.   Yes Provider, MD Saul   sertraline (ZOLOFT) 100 MG tablet  1/1/22  Yes Provider, MD Saul   SITagliptin (JANUVIA) 50 MG tablet Take 50 mg by mouth Daily.   Yes Provider, MD Saul   traMADol-acetaminophen (ULTRACET) 37.5-325 MG per tablet Take 1 tablet by mouth At Night As Needed for Moderate Pain. 2/7/23  Yes Lenin Rashid MD     ________________________________________    The following portions of the patient's history were reviewed and updated as appropriate: allergies, current medications, past family history, past medical history, past social history, past surgical history and problem list.    Review of Systems   Constitutional: Negative.    HENT: Negative.    Eyes: Negative for visual disturbance.   Respiratory: Negative for cough, shortness of breath and wheezing.    Cardiovascular: Negative for chest pain, palpitations and leg swelling.   Gastrointestinal: Negative for abdominal distention, abdominal pain, blood in stool, constipation, diarrhea, nausea and vomiting.   Endocrine: Negative for cold intolerance and heat intolerance.   Genitourinary: Negative for difficulty urinating, dyspareunia, dysuria, frequency, genital sores, hematuria, menstrual problem, pelvic pain, urgency, vaginal bleeding, vaginal discharge and vaginal pain.   Musculoskeletal: Negative.    Skin: Negative.    Neurological: Negative for dizziness, weakness, light-headedness, numbness and headaches.   Hematological: Negative.    Psychiatric/Behavioral: Negative.    Breasts: negative for lumps skin changes, dimpling, swelling, nipple changes/discharge bilaterally  "    Objective   Physical Exam    /62   Ht 175.3 cm (69\")   Wt 94.8 kg (209 lb)   BMI 30.86 kg/m²    BP Readings from Last 3 Encounters:   02/09/23 102/62   02/07/23 130/70   01/23/23 147/85      Wt Readings from Last 3 Encounters:   02/09/23 94.8 kg (209 lb)   02/07/23 95.8 kg (211 lb 3.2 oz)   01/23/23 96.7 kg (213 lb 3.2 oz)        BMI: Estimated body mass index is 30.86 kg/m² as calculated from the following:    Height as of this encounter: 175.3 cm (69\").    Weight as of this encounter: 94.8 kg (209 lb).            General:   alert, appears stated age and cooperative   Heart: regular rate and rhythm, S1, S2 normal, no murmur, click, rub or gallop   Lungs: clear to auscultation bilaterally   Abdomen: soft, non-tender, without masses or organomegaly   Breast: inspection negative, no nipple discharge or bleeding, no masses or nodularity palpable   Vulva: External genitalia including bartholin's glands, Urethra, [place]'s gland and urethra meatus are normal, Perineum, rectum and anus appear normal  and Bladder appears normal without significant prolapse    Vagina: normal mucosa, normal discharge   Cervix: no cervical motion tenderness and no lesions   Uterus: normal size, mobile and non-tender   Adnexa: normal adnexa     Assessment:    Diagnoses and all orders for this visit:    1. Well woman exam with routine gynecological exam (Primary)  -     IgP, Aptima HPV    2. Screening mammogram for breast cancer  -     Mammo Screening Digital Tomosynthesis Bilateral With CAD; Future      Healthy lifestyle modifications discussed, counseled on self breast exams and bone health    All of the patient's questions were addressed and answered, I have encouraged her to call for today's test results if she has not received them within 10 days.  Patient is advised to call with any change in her condition or with any other questions, otherwise return in 12 months for annual examination.        Carmen Ferrell, APRN  2/9/2023 " 11:07 EST

## 2023-02-16 LAB
CYTOLOGIST CVX/VAG CYTO: NORMAL
CYTOLOGY CVX/VAG DOC CYTO: NORMAL
CYTOLOGY CVX/VAG DOC THIN PREP: NORMAL
DX ICD CODE: NORMAL
HIV 1 & 2 AB SER-IMP: NORMAL
HPV I/H RISK 4 DNA CVX QL PROBE+SIG AMP: NEGATIVE
OTHER STN SPEC: NORMAL
PATHOLOGIST CVX/VAG CYTO: NORMAL
STAT OF ADQ CVX/VAG CYTO-IMP: NORMAL

## 2023-02-28 DIAGNOSIS — C85.91 NON-HODGKIN LYMPHOMA OF LYMPH NODES OF HEAD, UNSPECIFIED NON-HODGKIN LYMPHOMA TYPE: Primary | ICD-10-CM

## 2023-03-06 ENCOUNTER — PATIENT ROUNDING (BHMG ONLY) (OUTPATIENT)
Dept: ENDOCRINOLOGY | Age: 68
End: 2023-03-06

## 2023-03-06 ENCOUNTER — OFFICE VISIT (OUTPATIENT)
Dept: ENDOCRINOLOGY | Age: 68
End: 2023-03-06
Payer: MEDICARE

## 2023-03-06 VITALS
BODY MASS INDEX: 31.49 KG/M2 | SYSTOLIC BLOOD PRESSURE: 116 MMHG | DIASTOLIC BLOOD PRESSURE: 76 MMHG | WEIGHT: 212.6 LBS | HEART RATE: 69 BPM | HEIGHT: 69 IN | TEMPERATURE: 96.9 F | OXYGEN SATURATION: 98 %

## 2023-03-06 DIAGNOSIS — E03.9 HYPOTHYROIDISM, UNSPECIFIED TYPE: ICD-10-CM

## 2023-03-06 DIAGNOSIS — E11.65 TYPE 2 DIABETES MELLITUS WITH HYPERGLYCEMIA, WITHOUT LONG-TERM CURRENT USE OF INSULIN: Primary | ICD-10-CM

## 2023-03-06 PROCEDURE — 99204 OFFICE O/P NEW MOD 45 MIN: CPT | Performed by: INTERNAL MEDICINE

## 2023-03-06 RX ORDER — LEVOTHYROXINE SODIUM 0.12 MG/1
TABLET ORAL
Qty: 90 TABLET | Refills: 3 | Status: SHIPPED | OUTPATIENT
Start: 2023-03-06

## 2023-03-06 NOTE — PROGRESS NOTES
"New Patient      Chief Complaint    Chief Complaint   Patient presents with   • Diabetes     Type 2: Pt doesn't have meter, hasn't been checking bs regularly, is up to date on eye exam, no hx of retinopathy or neuropathy.    • Hypothyroidism     Pt states that energy levels have been low, weight is stable, no family hx of thyroid disease.         HPI:   Dorothy J Zawada is a 67 y.o. female sent to us for consultative evaluation and management of type 2 diabetes and hypothyroidism.  She has a history of type 2 diabetes that was diagnosed sometime in 1999, after she was diagnosed and treated for non-Hodgkin's lymphoma.  She currently does not have any evidence of diabetic polyneuropathy.  Her last dilated eye examination was about a week ago and according to her, it was normal without any evidence of diabetic retinopathy.  She has a history of coronary heart disease and is status postmyocardial infarction in 1996, which was treated with for stent placement.  She does not have any evidence of chronic kidney disease.  Her creatinine on January 11, 2023, was 0.77 mg/dL with an estimated GFR of 84.  For the treatment of the diabetes she is currently on metformin as 500 mg 2 times daily which she tolerates well but she was complaining that \"it is not doing anything for me.\"  She does not have any side effects from the metformin.  She is also on Januvia as 50 mg once daily.  She does not do any self-monitoring of blood glucose \"because I am tired of sticking my fingers.\"  With what she is doing, her A1c on January 11, 2023, was 6.4%.  She has a history of hypothyroidism that was diagnosed after she had mantle radiotherapy for the treatment of the non-Hodgkin's lymphoma as the field of radiation did include the neck and the thyroid area.  She is on L-thyroxine replacement therapy and stated that at some point she was taking 200 mcg daily \"and I felt good on it.\"  The dose was decreased and she is meant to be taking 137 mcg " "daily.  She however is taking \"150 mcg alternating with 100 mcg.\" \"And that gives me approximately 137 mcg daily.\"  Her TSH checked on 2023, was suppressed at 0.007 uIU/mL with a free T4 of 2.05 ng/dL.  She is adamant that \"the TSH is not the best way to monitor my levothyroxine dose and they have not been checking my T3 levels.\"  She has a history of osteopenia and a history of atrial fibrillation.          Past Medical History:   Diagnosis Date   • Anemia    • Arthritis    • ASHD (arteriosclerotic heart disease)    • Carcinoma (HCC)     Basal Cell/Squamous Cell   • Coronary artery calcification    • Diabetes insipidus (HCC)    • Diabetes mellitus (HCC)    • Disease of thyroid gland    • Hiatal hernia    • Hyperlipidemia    • Hypertension    • Hypothyroidism    • Mantoux: positive    • Osteopenia    • Plantar fasciitis    • Type 2 diabetes mellitus (HCC)    • Ulcer of abdomen wall (HCC)    • Ulcers of both great toes (HCC)    • Vitamin D deficiency           ROS:  Pertinent to this visit, only as mentioned above.  The rest was negative.    Social History     Socioeconomic History   • Marital status: Legally    Tobacco Use   • Smoking status: Former     Packs/day: 2.50     Years: 20.00     Pack years: 50.00     Types: Cigarettes     Start date: 1969     Quit date: 1996     Years since quittin.1   • Smokeless tobacco: Never   Vaping Use   • Vaping Use: Never used   Substance and Sexual Activity   • Alcohol use: Not Currently   • Drug use: Not Currently   • Sexual activity: Not Currently   :     Physical Exam:  GENERAL: The patient looked well but was rather agitated in her discussion and demeanor.  Accompanied by friend   HEENT: Normal examination.  NECK: Normal examination with no palpable thyroid enlargement or discrete thyroid nodules  LUNGS: Clear to auscultation bilaterally  CVS: RRR  EXTREMITIES: Normal examination.    Assessment:  1.  Well-controlled type 2 " diabetes as mentioned above.  2.  Hypothyroidism rather over replaced on L-thyroxine replacement therapy.    Diagnoses and all orders for this visit:    1. Type 2 diabetes mellitus with hyperglycemia, without long-term current use of insulin (HCC) (Primary)  -     TSH; Future  -     T4, Free; Future  -     Hemoglobin A1c; Future    2. Hypothyroidism, unspecified type  -     TSH; Future  -     T4, Free; Future  -     Hemoglobin A1c; Future    Other orders  -     SITagliptin (Januvia) 100 MG tablet; By mouth take one tab daily.  Dispense: 90 tablet; Refill: 3  -     levothyroxine (SYNTHROID, LEVOTHROID) 125 MCG tablet; By mouth take 1 tab daily in AM as directed on empty stomach with water only.  Dispense: 90 tablet; Refill: 3    Recommendations:  1.  We reviewed with Ms. Zawada and her friend, the history and the diagnosis of the diabetes, her current treatment, medications and talked about her good to excellent glycemic control.  2.  We discussed the fact that contrary to what she believes the metformin is actually doing what it is supposed to be doing.  3.  Because she is not experiencing any side effects from it, our recommendation is for her to continue with the metformin and we do in fact recommend to increase the Januvia to 100 mg daily.  4.  We had a lengthy discussion about the hypothyroidism, her L-thyroxine replacement therapy and levothyroxine dose as well as repeat labs which showed clearly that she is overdosed on what she is currently taking as indicated by the suppressed TSH and elevated free T4.  5.  She was very unreasonably argumentative and agitated about it and did not wish to decrease her dose and she said she wished to see another physician.  6.  We discussed the fact that yes, she could see another physician but that might not happen today.  However, we would be willing to make an appointment for her to see another endocrinologist.  7.  After some time and some thought she calmed down and asked  what our recommendation would be.  8.  We reviewed the dangers of taking too much more thyroid replacement medication than she needs such as atrial fibrillation and osteopenia as she already has.  9.  We discussed the fact that our recommendation would be to decrease the levothyroxine dose to 125 mcg daily and reevaluate in 3 months.  She eventually agreed.  10.  We therefore gave her a new prescription for levothyroxine as 125 mcg daily and she will come back for follow-up with a repeat TSH in 3 months to assess the adequacy of the new dose.  11.  We gave her the opportunity to ask questions, which we answered and we addressed her concerns.

## 2023-03-09 ENCOUNTER — CONSULT (OUTPATIENT)
Dept: ONCOLOGY | Facility: CLINIC | Age: 68
End: 2023-03-09
Payer: MEDICARE

## 2023-03-09 ENCOUNTER — LAB (OUTPATIENT)
Dept: OTHER | Facility: HOSPITAL | Age: 68
End: 2023-03-09
Payer: MEDICARE

## 2023-03-09 VITALS
TEMPERATURE: 97.7 F | RESPIRATION RATE: 16 BRPM | OXYGEN SATURATION: 99 % | DIASTOLIC BLOOD PRESSURE: 76 MMHG | HEART RATE: 73 BPM | WEIGHT: 215.5 LBS | BODY MASS INDEX: 31.92 KG/M2 | SYSTOLIC BLOOD PRESSURE: 121 MMHG | HEIGHT: 69 IN

## 2023-03-09 DIAGNOSIS — D50.8 IRON DEFICIENCY ANEMIA AFTER GASTRECTOMY: Primary | ICD-10-CM

## 2023-03-09 DIAGNOSIS — C85.91 NON-HODGKIN LYMPHOMA OF LYMPH NODES OF HEAD, UNSPECIFIED NON-HODGKIN LYMPHOMA TYPE: ICD-10-CM

## 2023-03-09 DIAGNOSIS — E53.8 FOLATE DEFICIENCY: ICD-10-CM

## 2023-03-09 DIAGNOSIS — E53.8 B12 DEFICIENCY: ICD-10-CM

## 2023-03-09 DIAGNOSIS — K91.89 IRON DEFICIENCY ANEMIA AFTER GASTRECTOMY: Primary | ICD-10-CM

## 2023-03-09 PROBLEM — K90.9 MALABSORPTION OF IRON: Status: ACTIVE | Noted: 2023-03-09

## 2023-03-09 PROBLEM — Z98.84 HISTORY OF ROUX-EN-Y GASTRIC BYPASS: Status: ACTIVE | Noted: 2023-03-09

## 2023-03-09 LAB
BASOPHILS # BLD AUTO: 0.08 10*3/MM3 (ref 0–0.2)
BASOPHILS NFR BLD AUTO: 0.8 % (ref 0–1.5)
DEPRECATED RDW RBC AUTO: 50.6 FL (ref 37–54)
EOSINOPHIL # BLD AUTO: 0.16 10*3/MM3 (ref 0–0.4)
EOSINOPHIL NFR BLD AUTO: 1.6 % (ref 0.3–6.2)
ERYTHROCYTE [DISTWIDTH] IN BLOOD BY AUTOMATED COUNT: 14.6 % (ref 12.3–15.4)
FERRITIN SERPL-MCNC: 26 NG/ML (ref 13–150)
FOLATE SERPL-MCNC: 4.2 NG/ML (ref 4.78–24.2)
HCT VFR BLD AUTO: 34.5 % (ref 34–46.6)
HGB BLD-MCNC: 11.1 G/DL (ref 12–15.9)
IMM GRANULOCYTES # BLD AUTO: 0.02 10*3/MM3 (ref 0–0.05)
IMM GRANULOCYTES NFR BLD AUTO: 0.2 % (ref 0–0.5)
IRON 24H UR-MRATE: 38 MCG/DL (ref 37–145)
IRON SATN MFR SERPL: 8 % (ref 20–50)
LDH SERPL-CCNC: 197 U/L (ref 135–214)
LYMPHOCYTES # BLD AUTO: 2.14 10*3/MM3 (ref 0.7–3.1)
LYMPHOCYTES NFR BLD AUTO: 21.1 % (ref 19.6–45.3)
MCH RBC QN AUTO: 30.2 PG (ref 26.6–33)
MCHC RBC AUTO-ENTMCNC: 32.2 G/DL (ref 31.5–35.7)
MCV RBC AUTO: 93.8 FL (ref 79–97)
MONOCYTES # BLD AUTO: 0.74 10*3/MM3 (ref 0.1–0.9)
MONOCYTES NFR BLD AUTO: 7.3 % (ref 5–12)
NEUTROPHILS NFR BLD AUTO: 6.98 10*3/MM3 (ref 1.7–7)
NEUTROPHILS NFR BLD AUTO: 69 % (ref 42.7–76)
NRBC BLD AUTO-RTO: 0 /100 WBC (ref 0–0.2)
PLATELET # BLD AUTO: 275 10*3/MM3 (ref 140–450)
PMV BLD AUTO: 9.2 FL (ref 6–12)
RBC # BLD AUTO: 3.68 10*6/MM3 (ref 3.77–5.28)
TIBC SERPL-MCNC: 481 MCG/DL (ref 298–536)
TRANSFERRIN SERPL-MCNC: 323 MG/DL (ref 200–360)
VIT B12 BLD-MCNC: 272 PG/ML (ref 211–946)
WBC NRBC COR # BLD: 10.12 10*3/MM3 (ref 3.4–10.8)

## 2023-03-09 PROCEDURE — 84466 ASSAY OF TRANSFERRIN: CPT | Performed by: INTERNAL MEDICINE

## 2023-03-09 PROCEDURE — 99204 OFFICE O/P NEW MOD 45 MIN: CPT | Performed by: INTERNAL MEDICINE

## 2023-03-09 PROCEDURE — 82728 ASSAY OF FERRITIN: CPT | Performed by: INTERNAL MEDICINE

## 2023-03-09 PROCEDURE — 83540 ASSAY OF IRON: CPT | Performed by: INTERNAL MEDICINE

## 2023-03-09 PROCEDURE — 83615 LACTATE (LD) (LDH) ENZYME: CPT | Performed by: INTERNAL MEDICINE

## 2023-03-09 PROCEDURE — 82607 VITAMIN B-12: CPT | Performed by: INTERNAL MEDICINE

## 2023-03-09 PROCEDURE — 36415 COLL VENOUS BLD VENIPUNCTURE: CPT

## 2023-03-09 PROCEDURE — 85025 COMPLETE CBC W/AUTO DIFF WBC: CPT | Performed by: INTERNAL MEDICINE

## 2023-03-09 PROCEDURE — 1125F AMNT PAIN NOTED PAIN PRSNT: CPT | Performed by: INTERNAL MEDICINE

## 2023-03-09 PROCEDURE — 82746 ASSAY OF FOLIC ACID SERUM: CPT | Performed by: INTERNAL MEDICINE

## 2023-03-09 PROCEDURE — 3074F SYST BP LT 130 MM HG: CPT | Performed by: INTERNAL MEDICINE

## 2023-03-09 PROCEDURE — 3078F DIAST BP <80 MM HG: CPT | Performed by: INTERNAL MEDICINE

## 2023-03-09 NOTE — PROGRESS NOTES
.     REASON FOR CONSULTATION:     Provide an opinion on any further workup or treatment of iron deficiency anemia                             REQUESTING PHYSICIAN: Lenin Rashid MD     RECORDS OBTAINED:  Records of the patient's history including those obtained from the referring provider were reviewed and summarized in detail.    HISTORY OF PRESENT ILLNESS:  The patient is a 67 y.o. year old female who presented today 3/9/2023 for initial evaluation of anemia.    The patient reports she had non-Hodgkin's lymphoma from 1999 to 2000. She was treated with CHOP and radiation. She was treated in [place]. She had the lymphoma in her nasal cavity, going down the back of her throat, and lost her hearing. They found it also in her right upper lung. She relays she had chemotherapy. She was taking prednisone for 5 days. She has been doing okay. She has seen her oncologist.    The patient reports she had a gastric bypass in 2007. She goes into anemia at least once a year. Her last B12 shot was 1 year ago. Her last I.V iron was 1 year ago. She reports she is tired and chewing ice cubes.    In summary, patient reports she has continued significant fatigue. She is having pica for ice chips. She reports has been given IV iron therapy multiple times since her gastric bypass in 2007, last intravenous iron therapy was about 1 year ago. She also was given B12 injection and the last dose was also about 1 year ago.    She had first heart attack in 1996 and had 2 stents placed at that time. She had 2 stents placed later on. No CHF.    Laboratory studies today on 3/9/2023 reported mild anemia hemoglobin 11.1, MCV 93.8 MCHC 32.2, normal platelets 275,000 WBC 10,120 including neutrophils 6980 and lymphocytes 2140.    Past Medical History:   Diagnosis Date   • Anemia    • Arthritis    • ASHD (arteriosclerotic heart disease)    • Carcinoma (HCC)     Basal Cell/Squamous Cell   • Coronary artery calcification    • Diabetes  insipidus (HCC)    • Diabetes mellitus (HCC)    • Disease of thyroid gland    • Hiatal hernia    • Hyperlipidemia    • Hypertension    • Hypothyroidism    • Mantoux: positive    • Osteopenia    • Plantar fasciitis    • Type 2 diabetes mellitus (HCC)    • Ulcer of abdomen wall (HCC)    • Ulcers of both great toes (HCC)    • Vitamin D deficiency      Past Surgical History:   Procedure Laterality Date   • ANGIOPLASTY     • ANGIOPLASTY     • CARDIAC CATHETERIZATION      Stents   •  SECTION     • DENTAL PROCEDURE     • DILATATION AND CURETTAGE     • FOOT SURGERY Left    • GASTRIC BYPASS     • GASTRIC RESTRICTION SURGERY  See my chart   • HERNIA REPAIR     • KNEE SURGERY Left     Total Knee   • LUNG REMOVAL, PARTIAL     • MOHS SURGERY N/A     Face and head   • PACEMAKER IMPLANTATION     • REPLACEMENT TOTAL KNEE Bilateral    • TONSILLECTOMY         MEDICATIONS    Current Outpatient Medications:   •  acetaminophen (TYLENOL) 500 MG tablet, Take 1 tablet by mouth Every 6 (Six) Hours As Needed for Mild Pain., Disp: , Rfl:   •  ALPRAZolam (XANAX) 0.25 MG tablet, Take 2 tablets by mouth Daily., Disp: , Rfl:   •  ALPRAZolam (Xanax) 0.5 MG tablet, Take 1 tablet by mouth 2 (Two) Times a Day As Needed for Anxiety., Disp: 30 tablet, Rfl: 3  •  apixaban (ELIQUIS) 5 MG tablet tablet, Take 1 tablet by mouth Every 12 (Twelve) Hours., Disp: 180 tablet, Rfl: 3  •  Diclofenac Sodium 3 % gel gel, Apply  topically to the appropriate area as directed 2 (Two) Times a Day. for 60 days., Disp: , Rfl:   •  levothyroxine (SYNTHROID, LEVOTHROID) 125 MCG tablet, By mouth take 1 tab daily in AM as directed on empty stomach with water only., Disp: 90 tablet, Rfl: 3  •  metFORMIN (GLUCOPHAGE) 500 MG tablet, Take 1 tablet by mouth 2 (Two) Times a Day With Meals., Disp: , Rfl:   •  metoprolol tartrate (LOPRESSOR) 25 MG tablet, Take 12.5 mg by mouth 2 (Two) Times a Day., Disp: , Rfl:   •  mometasone (NASONEX) 50  MCG/ACT nasal spray, 2 sprays into the nostril(s) as directed by provider Daily., Disp: , Rfl:   •  pantoprazole (Protonix) 40 MG EC tablet, Take 1 tablet by mouth Daily., Disp: 90 tablet, Rfl: 3  •  rosuvastatin (CRESTOR) 20 MG tablet, Take 1 tablet by mouth Daily., Disp: , Rfl:   •  sertraline (ZOLOFT) 100 MG tablet, , Disp: , Rfl:   •  SITagliptin (Januvia) 100 MG tablet, By mouth take one tab daily., Disp: 90 tablet, Rfl: 3  •  traMADol-acetaminophen (ULTRACET) 37.5-325 MG per tablet, Take 1 tablet by mouth At Night As Needed for Moderate Pain., Disp: 90 tablet, Rfl: 0    ALLERGIES:     Allergies   Allergen Reactions   • Claritin [Loratadine] Palpitations   • Fish-Derived Products GI Intolerance and Nausea And Vomiting   • Zocor [Simvastatin] Myalgia   • Allegra [Fexofenadine] Palpitations   • Pseudoephedrine Palpitations   • Wool Alcohol [Lanolin] Rash   • Zithromax [Azithromycin] Rash   • Zyrtec [Cetirizine] Palpitations       SOCIAL HISTORY:       Social History     Socioeconomic History   • Marital status: Legally    Tobacco Use   • Smoking status: Former     Packs/day: 2.50     Years: 20.00     Pack years: 50.00     Types: Cigarettes     Start date: 1969     Quit date: 1996     Years since quittin.2   • Smokeless tobacco: Never   Vaping Use   • Vaping Use: Never used   Substance and Sexual Activity   • Alcohol use: Not Currently   • Drug use: Not Currently   • Sexual activity: Not Currently         FAMILY HISTORY:  Family History   Problem Relation Age of Onset   • Heart disease Mother    • Hypertension Mother            • Diabetes Mother            • Obesity Mother    • Kidney disease Father    • Heart attack Father    • Heart disease Father    • Hypertension Father            • Obesity Father    • Diabetes Brother            • Hypertension Brother            • Obesity Brother    • Anxiety disorder Daughter    • ADD / ADHD Daughter    • Breast  "cancer Paternal Aunt    • Tuberculosis Paternal Aunt    • Diabetes Maternal Grandmother            • Colon cancer Maternal Grandmother    • Cancer Maternal Grandmother         Colon   • Heart disease Paternal Grandfather    • Cancer Paternal Aunt         Breast       REVIEW OF SYSTEMS:  Review of Systems see HPI.           Vitals:    23 1508   BP: 121/76   Pulse: 73   Resp: 16   Temp: 97.7 °F (36.5 °C)   TempSrc: Temporal   SpO2: 99%   Weight: 97.8 kg (215 lb 8 oz)   Height: 175 cm (68.9\")   PainSc:   4   PainLoc: Generalized  Comment: all over body pain and ache     Current Status 3/9/2023   ECOG score 0      PHYSICAL EXAM:      CONSTITUTIONAL:  Vital signs reviewed.  Well-developed overnourished female.  No distress, looks comfortable.  EYES:  Conjunctivae and lids unremarkable.  PERRLA  EARS,NOSE,MOUTH,THROAT:  Ears and nose appear unremarkable.  Lips, teeth, gums appear unremarkable.  RESPIRATORY:  Normal respiratory effort.  Lungs clear to auscultation bilaterally.  CARDIOVASCULAR:  Normal S1, S2.  No murmurs rubs or gallops.  No significant lower extremity edema.  GASTROINTESTINAL: Abdomen appears unremarkable.  Nontender.  No hepatomegaly.  No splenomegaly.  LYMPHATIC:  No cervical, supraclavicular, axillary lymphadenopathy.  MUSCULOSKELETAL:  Unremarkable gait and station.  Unremarkable digits/nails.  No cyanosis or clubbing.  SKIN:  Warm.  No rashes.  PSYCHIATRIC:  Normal judgment and insight.  Normal mood and affect.      RECENT LABS:        WBC   Date Value Ref Range Status   2023 7.1 3.4 - 10.8 x10E3/uL Final     Hemoglobin   Date Value Ref Range Status   2023 11.6 11.1 - 15.9 g/dL Final     Platelets   Date Value Ref Range Status   2023 269 150 - 450 x10E3/uL Final     Lab Results   Component Value Date    GLUCOSE 133 (H) 2023    BUN 11 2023    CREATININE 0.77 2023    EGFRRESULT 84 2023    BCR 14 2023    K 4.1 2023    CO2 23 2023 "    CALCIUM 9.1 01/11/2023    PROTENTOTREF 6.3 01/11/2023    ALBUMIN 4.1 01/11/2023    BILITOT 0.4 01/11/2023    AST 17 01/11/2023    ALT 4 01/11/2023         Assessment & Plan     1. Iron deficiency anemia post R-on-Y gastric bypass for weight losing purpose in 2007.   · This patient has poor iron absorption because of that.   · I recommended to obtain iron studies today to document a new iron level, and schedule patient for intravenous iron therapy with Injectafer 750 mg weekly for two doses. If insurance declines that, we will treat her with Venofer 300 mg for 5 doses.   · Her iron needs to be monitored periodically because she will develop iron deficiency again due to poor absorption of iron due to change of a gastric structure.    2. Vitamin B12 deficiency also due to gastric bypass.   · I recommended to obtain vitamin B12 level today to evaluate. If low, she will be started on vitamin B12 injection. I discussed with her about possible self injection and if her insurance approves that, she is diabetic and has been given insulin injection.    3. History of non-Hodgkin's lymphoma diagnosed 20 years ago in 1999 to 2020 and was given chemotherapy CHOP together with radiation therapy. She had a last scan 2 years ago with no evidence for disease recurrence. I discussed with her that since she is not having any symptoms, I do not think she needs to have a routine imaging studies according to NCCN guideline.      PLAN:  1. Laboratory studies today for ferritin, iron profile, folate level.  2. Arrange patient to receive intravenous iron therapy with Injectafer, 750 mg weekly for two doses.  3. Check vitamin B12 level, possibly starting vitamin B12 injection.  4. I will bring patient back for reevaluation in 4 months, we will repeat a CBC, ferritin, iron profile, and the vitamin B12 level for reassessment.      I discussed with the patient about laboratory results and further management plan.  Patient voiced understanding  "and agreeable.      BRADEN GUTIERREZ M.D., Ph.D.               Transcribed from ambient dictation for Braden Gutierrez MD PhD by Mainor Brewster.  03/09/23   17:28 EST    RYAN Provider Statement:[id number]::\"Patient or patient representative verbalized consent to the visit recording.\",\"I have personally performed the services described in this document as transcribed by the above individual, and it is both accurate and complete.\"      Addendum:  Lab Results   Component Value Date    IRON 38 03/09/2023    TIBC 481 03/09/2023    FERRITIN 26.00 03/09/2023   Iron saturation 8% on 3/9/2023     Lab Results   Component Value Date    FOLATE 4.20 (L) 03/09/2023     Lab Results   Component Value Date    PUCUQLTP33 272 03/09/2023     Laboratory study not only confirms iron deficiency, but also B12 deficiency and folate deficiency.      We also need to start patient on vitamin B12 intramuscular injection monthly.  We will get insurance permission for self-injection.      She will also need to start folic acid 1 mg daily.    BRADEN GUTIERREZ M.D., Ph.D.      CC:  Lenin Rashid MD    "

## 2023-03-13 DIAGNOSIS — F41.9 ANXIETY: ICD-10-CM

## 2023-03-13 DIAGNOSIS — G47.00 INSOMNIA, UNSPECIFIED TYPE: ICD-10-CM

## 2023-03-13 RX ORDER — ROSUVASTATIN CALCIUM 20 MG/1
20 TABLET, COATED ORAL DAILY
Qty: 90 TABLET | Refills: 3 | Status: SHIPPED | OUTPATIENT
Start: 2023-03-13

## 2023-03-13 RX ORDER — SERTRALINE HYDROCHLORIDE 100 MG/1
100 TABLET, FILM COATED ORAL DAILY
Qty: 90 TABLET | Refills: 3 | Status: SHIPPED | OUTPATIENT
Start: 2023-03-13

## 2023-03-13 RX ORDER — ALPRAZOLAM 0.5 MG/1
0.5 TABLET ORAL 2 TIMES DAILY PRN
Qty: 30 TABLET | Refills: 1 | Status: SHIPPED | OUTPATIENT
Start: 2023-03-13

## 2023-03-13 NOTE — TELEPHONE ENCOUNTER
Caller: ZawadaLissette    Relationship: Self    Best call back number: [phone]    Requested Prescriptions:   Requested Prescriptions     Pending Prescriptions Disp Refills   • ALPRAZolam (Xanax) 0.5 MG tablet 30 tablet 3     Sig: Take 1 tablet by mouth 2 (Two) Times a Day As Needed for Anxiety.   • rosuvastatin (CRESTOR) 20 MG tablet 90 tablet      Sig: Take 1 tablet by mouth Daily.   • sertraline (ZOLOFT) 100 MG tablet          Pharmacy where request should be sent: [facility] [address] [phone] [facility] [phone] [id number] [place] [id number] AT [facility] [place] - [phone] [facility] [phone] FX     Additional details provided by patient:     Does the patient have less than a 3 day supply:  [x] Yes  [] No    Would you like a call back once the refill request has been completed: [] Yes [] No    If the office needs to give you a call back, can they leave a voicemail: [] Yes [] No    Clinton Perez Rep   03/13/23 10:49 EDT

## 2023-03-17 ENCOUNTER — INFUSION (OUTPATIENT)
Dept: ONCOLOGY | Facility: HOSPITAL | Age: 68
End: 2023-03-17
Payer: MEDICARE

## 2023-03-17 VITALS
SYSTOLIC BLOOD PRESSURE: 159 MMHG | TEMPERATURE: 96.3 F | WEIGHT: 216 LBS | DIASTOLIC BLOOD PRESSURE: 87 MMHG | HEART RATE: 76 BPM | OXYGEN SATURATION: 97 % | BODY MASS INDEX: 31.99 KG/M2 | HEIGHT: 69 IN | RESPIRATION RATE: 18 BRPM

## 2023-03-17 DIAGNOSIS — D50.8 IRON DEFICIENCY ANEMIA AFTER GASTRECTOMY: Primary | ICD-10-CM

## 2023-03-17 DIAGNOSIS — K90.9 MALABSORPTION OF IRON: ICD-10-CM

## 2023-03-17 DIAGNOSIS — K91.89 IRON DEFICIENCY ANEMIA AFTER GASTRECTOMY: Primary | ICD-10-CM

## 2023-03-17 PROCEDURE — A9270 NON-COVERED ITEM OR SERVICE: HCPCS | Performed by: INTERNAL MEDICINE

## 2023-03-17 PROCEDURE — 25010000002 FERRIC CARBOXYMALTOSE 750 MG/15ML SOLUTION 15 ML VIAL: Performed by: INTERNAL MEDICINE

## 2023-03-17 PROCEDURE — 63710000001 PROCHLORPERAZINE MALEATE PER 5 MG: Performed by: INTERNAL MEDICINE

## 2023-03-17 PROCEDURE — 96374 THER/PROPH/DIAG INJ IV PUSH: CPT

## 2023-03-17 RX ORDER — SODIUM CHLORIDE 9 MG/ML
250 INJECTION, SOLUTION INTRAVENOUS ONCE
Status: COMPLETED | OUTPATIENT
Start: 2023-03-17 | End: 2023-03-17

## 2023-03-17 RX ORDER — PROCHLORPERAZINE MALEATE 5 MG/1
10 TABLET ORAL ONCE
Status: COMPLETED | OUTPATIENT
Start: 2023-03-17 | End: 2023-03-17

## 2023-03-17 RX ADMIN — PROCHLORPERAZINE MALEATE 10 MG: 5 TABLET ORAL at 13:21

## 2023-03-17 RX ADMIN — FERRIC CARBOXYMALTOSE INJECTION 750 MG: 50 INJECTION, SOLUTION INTRAVENOUS at 13:35

## 2023-03-17 RX ADMIN — SODIUM CHLORIDE 250 ML: 9 INJECTION, SOLUTION INTRAVENOUS at 13:19

## 2023-03-19 PROBLEM — E53.8 FOLATE DEFICIENCY: Status: ACTIVE | Noted: 2023-03-19

## 2023-03-20 ENCOUNTER — TELEPHONE (OUTPATIENT)
Dept: ONCOLOGY | Facility: CLINIC | Age: 68
End: 2023-03-20
Payer: MEDICARE

## 2023-03-20 RX ORDER — SYRINGE W-NEEDLE,DISPOSAB,3 ML 25GX5/8"
1 SYRINGE, EMPTY DISPOSABLE MISCELLANEOUS
Qty: 50 EACH | Refills: 1 | Status: SHIPPED | OUTPATIENT
Start: 2023-03-20

## 2023-03-20 RX ORDER — FOLIC ACID 1 MG/1
1 TABLET ORAL DAILY
Qty: 90 TABLET | Refills: 1 | Status: SHIPPED | OUTPATIENT
Start: 2023-03-20

## 2023-03-20 NOTE — TELEPHONE ENCOUNTER
Per Dr Soriano patient to start folic acid 1 mg daily due to folate is low and patient to start  vitamin B12 injection monthly. Prescriptions sent to [facility].  Patient v/u

## 2023-03-23 ENCOUNTER — TELEPHONE (OUTPATIENT)
Dept: ONCOLOGY | Facility: CLINIC | Age: 68
End: 2023-03-23

## 2023-03-23 NOTE — TELEPHONE ENCOUNTER
Caller: Zawada, Dorothy J    Relationship: Self    Best call back number: [phone]    What is the best time to reach you:ANYTIME. LEAVE VM IF NEEDED.    Who are you requesting to speak with (clinical staff, provider,  specific staff member): ADIS        What was the call regarding: PATIENT LISSETTE CALLED IN TO R/S HER APPT FOR TOMORROW. SHE SAID SHE CAN'T MAKE IT AND DOESN'T THINK IT'S A GOOD IDEA TO COME IN BECAUSE SHE HAS AN UPPER RESPIRATORY INFECTION. PLEASE CALL BACK TO R/S. SHE STATED SHE WOULD PREFER TO COME IN NEXT Monday 3/27/23 ANYTIME AFTER 10AM    Do you require a callback: YES

## 2023-03-31 ENCOUNTER — INFUSION (OUTPATIENT)
Dept: ONCOLOGY | Facility: HOSPITAL | Age: 68
End: 2023-03-31
Payer: MEDICARE

## 2023-03-31 VITALS
SYSTOLIC BLOOD PRESSURE: 145 MMHG | HEIGHT: 69 IN | RESPIRATION RATE: 18 BRPM | DIASTOLIC BLOOD PRESSURE: 85 MMHG | WEIGHT: 214 LBS | TEMPERATURE: 96.4 F | BODY MASS INDEX: 31.7 KG/M2 | OXYGEN SATURATION: 96 % | HEART RATE: 80 BPM

## 2023-03-31 DIAGNOSIS — K90.9 MALABSORPTION OF IRON: ICD-10-CM

## 2023-03-31 DIAGNOSIS — D50.8 IRON DEFICIENCY ANEMIA AFTER GASTRECTOMY: Primary | ICD-10-CM

## 2023-03-31 DIAGNOSIS — K91.89 IRON DEFICIENCY ANEMIA AFTER GASTRECTOMY: Primary | ICD-10-CM

## 2023-03-31 PROCEDURE — 63710000001 PROCHLORPERAZINE MALEATE PER 5 MG: Performed by: NURSE PRACTITIONER

## 2023-03-31 PROCEDURE — 96374 THER/PROPH/DIAG INJ IV PUSH: CPT

## 2023-03-31 PROCEDURE — 25010000002 FERRIC CARBOXYMALTOSE 750 MG/15ML SOLUTION 15 ML VIAL: Performed by: NURSE PRACTITIONER

## 2023-03-31 PROCEDURE — A9270 NON-COVERED ITEM OR SERVICE: HCPCS | Performed by: NURSE PRACTITIONER

## 2023-03-31 RX ORDER — SODIUM CHLORIDE 9 MG/ML
250 INJECTION, SOLUTION INTRAVENOUS ONCE
Status: COMPLETED | OUTPATIENT
Start: 2023-03-31 | End: 2023-03-31

## 2023-03-31 RX ORDER — PROCHLORPERAZINE MALEATE 5 MG/1
10 TABLET ORAL ONCE
Status: COMPLETED | OUTPATIENT
Start: 2023-03-31 | End: 2023-03-31

## 2023-03-31 RX ADMIN — SODIUM CHLORIDE 250 ML: 9 INJECTION, SOLUTION INTRAVENOUS at 14:11

## 2023-03-31 RX ADMIN — PROCHLORPERAZINE MALEATE 10 MG: 5 TABLET ORAL at 14:11

## 2023-03-31 RX ADMIN — FERRIC CARBOXYMALTOSE INJECTION 750 MG: 50 INJECTION, SOLUTION INTRAVENOUS at 14:15

## 2023-05-08 ENCOUNTER — TELEPHONE (OUTPATIENT)
Dept: FAMILY MEDICINE CLINIC | Facility: CLINIC | Age: 68
End: 2023-05-08
Payer: MEDICARE

## 2023-05-08 ENCOUNTER — OFFICE VISIT (OUTPATIENT)
Dept: FAMILY MEDICINE CLINIC | Facility: CLINIC | Age: 68
End: 2023-05-08
Payer: MEDICARE

## 2023-05-08 VITALS
HEART RATE: 80 BPM | TEMPERATURE: 96.9 F | WEIGHT: 213 LBS | SYSTOLIC BLOOD PRESSURE: 108 MMHG | BODY MASS INDEX: 31.55 KG/M2 | DIASTOLIC BLOOD PRESSURE: 78 MMHG | HEIGHT: 69 IN | OXYGEN SATURATION: 97 %

## 2023-05-08 DIAGNOSIS — R25.2 BILATERAL LEG CRAMPS: ICD-10-CM

## 2023-05-08 DIAGNOSIS — M79.10 MUSCLE ACHE: ICD-10-CM

## 2023-05-08 DIAGNOSIS — K90.9 MALABSORPTION OF IRON: ICD-10-CM

## 2023-05-08 DIAGNOSIS — R53.83 FATIGUE, UNSPECIFIED TYPE: Primary | ICD-10-CM

## 2023-05-08 DIAGNOSIS — E11.9 TYPE 2 DIABETES MELLITUS WITHOUT COMPLICATION, WITHOUT LONG-TERM CURRENT USE OF INSULIN: ICD-10-CM

## 2023-05-08 DIAGNOSIS — F41.9 ANXIETY: ICD-10-CM

## 2023-05-08 DIAGNOSIS — G47.00 INSOMNIA, UNSPECIFIED TYPE: ICD-10-CM

## 2023-05-08 DIAGNOSIS — E03.9 HYPOTHYROIDISM, UNSPECIFIED TYPE: ICD-10-CM

## 2023-05-08 DIAGNOSIS — D64.9 ANEMIA, UNSPECIFIED TYPE: ICD-10-CM

## 2023-05-08 RX ORDER — CYCLOBENZAPRINE HCL 5 MG
5 TABLET ORAL 3 TIMES DAILY PRN
Qty: 90 TABLET | Refills: 3 | Status: SHIPPED | OUTPATIENT
Start: 2023-05-08 | End: 2023-05-08

## 2023-05-08 RX ORDER — TIZANIDINE 2 MG/1
2 TABLET ORAL EVERY 8 HOURS PRN
Qty: 90 TABLET | Refills: 1 | Status: SHIPPED | OUTPATIENT
Start: 2023-05-08

## 2023-05-08 NOTE — TELEPHONE ENCOUNTER
Dorothy Zawada Horowitz: [id number] -   PA Case ID: [id number] - Rx #: [id number]    Cyclobenzaprine was denied due to it being a high risk medication for patient's age.    I gave to Dr. Rashid and he prescribed tizanidine instead.

## 2023-05-08 NOTE — PROGRESS NOTES
"Chief Complaint  Anxiety, Follow-up, and Insomnia    Subjective        Dorothy J Zawada presents to [facility]  History of Present Illness  Lissette is an established patient presenting with niece for follow-up for anxiety and sleep.  She takes Tylenol PM and a Xanax tablet at bedtime, is able to get to sleep, but awakens within 4 hours.  Generalized pain/body ache when she awakens.  Takes another Tylenol PM and goes back to sleep.  She has not been on trazodone in the past, but has tried melatonin without improvement.  She reports anxiety is controlled with Zoloft which she is taking daily.    Lissette also reports fatigue, general body aches, and pains, tramadol makes head feel funny, so she avoids driving when she is taking it.  She has been following with heme-onc for iron infusions and her thyroid medications were adjusted at last endocrinology visit.  She reports increasing exercise some, walked 2 miles with her niece 2 days ago.    Lissette requests referral to endocrinologist outside of [facility] system.    Objective   Vital Signs:  /78 (BP Location: Left arm, Patient Position: Sitting, Cuff Size: Adult)   Pulse 80   Temp 96.9 °F (36.1 °C) (Temporal)   Ht 175.3 cm (69\")   Wt 96.6 kg (213 lb)   SpO2 97%   BMI 31.45 kg/m²   Estimated body mass index is 31.45 kg/m² as calculated from the following:    Height as of this encounter: 175.3 cm (69\").    Weight as of this encounter: 96.6 kg (213 lb).             Physical Exam  Vitals and nursing note reviewed.   Constitutional:       General: She is not in acute distress.     Appearance: Normal appearance.   HENT:      Head: Normocephalic and atraumatic.   Eyes:      Extraocular Movements: Extraocular movements intact.      Conjunctiva/sclera: Conjunctivae normal.   Cardiovascular:      Rate and Rhythm: Normal rate and regular rhythm.      Pulses: Normal pulses.      Heart sounds: Normal heart sounds. No murmur heard.    No " friction rub. No gallop.   Pulmonary:      Effort: Pulmonary effort is normal.      Breath sounds: Normal breath sounds. No wheezing, rhonchi or rales.   Abdominal:      General: Bowel sounds are normal. There is no distension.      Palpations: Abdomen is soft.      Tenderness: There is no abdominal tenderness. There is no right CVA tenderness, left CVA tenderness or guarding.   Musculoskeletal:      Right lower leg: No edema.      Left lower leg: No edema.   Skin:     General: Skin is warm.      Capillary Refill: Capillary refill takes less than 2 seconds.   Neurological:      General: No focal deficit present.      Mental Status: She is alert. Mental status is at baseline.   Psychiatric:         Mood and Affect: Mood and affect normal.         Speech: Speech normal.         Behavior: Behavior normal.        Result Review :                   Assessment and Plan   Diagnoses and all orders for this visit:    1. Fatigue, unspecified type (Primary)  -     CBC & Differential  -     Comprehensive Metabolic Panel  -     Lipid Panel  -     Anemia Profile B ([facility])  -     TSH  -     T4, free  -     tiZANidine (ZANAFLEX) 2 MG tablet; Take 1 tablet by mouth Every 8 (Eight) Hours As Needed for Muscle Spasms.  Dispense: 90 tablet; Refill: 1    2. Muscle ache  -     Discontinue: cyclobenzaprine (FLEXERIL) 5 MG tablet; Take 1 tablet by mouth 3 (Three) Times a Day As Needed for Muscle Spasms.  Dispense: 90 tablet; Refill: 3  -     tiZANidine (ZANAFLEX) 2 MG tablet; Take 1 tablet by mouth Every 8 (Eight) Hours As Needed for Muscle Spasms.  Dispense: 90 tablet; Refill: 1    3. Bilateral leg cramps  -     Discontinue: cyclobenzaprine (FLEXERIL) 5 MG tablet; Take 1 tablet by mouth 3 (Three) Times a Day As Needed for Muscle Spasms.  Dispense: 90 tablet; Refill: 3  -     tiZANidine (ZANAFLEX) 2 MG tablet; Take 1 tablet by mouth Every 8 (Eight) Hours As Needed for Muscle Spasms.  Dispense: 90 tablet; Refill: 1    4. Insomnia,  unspecified type  -     CBC & Differential  -     Comprehensive Metabolic Panel  -     Lipid Panel  -     Anemia Profile B ([facility])  -     TSH  -     T4, free    5. Anxiety    6. Hypothyroidism, unspecified type  -     CBC & Differential  -     Comprehensive Metabolic Panel  -     Lipid Panel  -     Anemia Profile B ([facility])  -     TSH  -     T4, free  -     Ambulatory Referral to Endocrinology  -     levothyroxine (Synthroid) 112 MCG tablet; Take 1 tablet by mouth Daily.  Dispense: 30 tablet; Refill: 11    7. Type 2 diabetes mellitus without complication, without long-term current use of insulin  -     Ambulatory Referral to Endocrinology    8. Anemia, unspecified type  -     Anemia Profile B ([facility])    9. Malabsorption of iron  -     Anemia Profile B ([facility])    Lissette is an established patient presenting with niece for follow-up for anxiety and sleep as well as concerns regarding fatigue/body ache, and endocrinology referral.    Muscle aches/fatigue/bilateral leg cramps: Patient recently with iron deficiency anemia s/p iron infusions as well as elevated TFTs.  Checking fatigue labs including electrolytes, CBC, iron profile, TFTs.  Encouraged regular exercise routine, especially first thing in the mornings.  Trial of Flexeril denied by Medicare.  We will send in trial of Zanaflex to see if that works with insurance.  We will notify patient and pharmacy of change.    Insomnia/anxiety: Tylenol PM and Xanax helping but awakens with generalized body aches and cramps.  Melatonin has not helped in the past, patient has never tried trazodone.  Trial of muscle relaxer since insomnia appears to be linked to body aches.  Consider trial of Atarax as Xanax replacement in the future.  Patient reports anxiety symptoms well managed with Zoloft.  Continuing.    Hypothyroidism/T2DM: Patient requests endocrinologist outside of the [facility] system to manage, specifically in the [place] system.  Referral provided.    Anemia/iron  malabsorption: Patient has follow-up with heme-onc.  Rechecking CBC and iron panel today since patient is reporting fatigue.    Preventative: Medicare wellness visit in 6 months, plan for microalbumin, DFE, hep C screening, review of Beers medications, vaccine review (no information received from patient or previous PCP on this).  Patient has upcoming mammogram and DEXA scan scheduled.    We will call with lab results and patient will follow-up as needed for insomnia, leg pain, fatigue.    05/10/23  TSH low, FT4 normal on 5/8/23 labs.  Sending in prescription for lower synthroid dose.           Follow Up   Return if symptoms worsen or fail to improve, for insomnia, leg pain, fatigue.  Patient was given instructions and counseling regarding her condition or for health maintenance advice. Please see specific information pulled into the AVS if appropriate.

## 2023-05-09 LAB
ALBUMIN SERPL-MCNC: 4.3 G/DL (ref 3.8–4.8)
ALBUMIN/GLOB SERPL: 1.9 {RATIO} (ref 1.2–2.2)
ALP SERPL-CCNC: 70 IU/L (ref 44–121)
ALT SERPL-CCNC: 5 IU/L (ref 0–32)
AST SERPL-CCNC: 24 IU/L (ref 0–40)
BASOPHILS # BLD AUTO: 0.1 X10E3/UL (ref 0–0.2)
BASOPHILS NFR BLD AUTO: 1 %
BILIRUB SERPL-MCNC: 0.3 MG/DL (ref 0–1.2)
BUN SERPL-MCNC: 10 MG/DL (ref 8–27)
BUN/CREAT SERPL: 13 (ref 12–28)
CALCIUM SERPL-MCNC: 9.2 MG/DL (ref 8.7–10.3)
CHLORIDE SERPL-SCNC: 104 MMOL/L (ref 96–106)
CHOLEST SERPL-MCNC: 132 MG/DL (ref 100–199)
CO2 SERPL-SCNC: 23 MMOL/L (ref 20–29)
CREAT SERPL-MCNC: 0.79 MG/DL (ref 0.57–1)
EGFRCR SERPLBLD CKD-EPI 2021: 81 ML/MIN/1.73
EOSINOPHIL # BLD AUTO: 0.2 X10E3/UL (ref 0–0.4)
EOSINOPHIL NFR BLD AUTO: 3 %
ERYTHROCYTE [DISTWIDTH] IN BLOOD BY AUTOMATED COUNT: 15 % (ref 11.7–15.4)
FERRITIN SERPL-MCNC: 633 NG/ML (ref 15–150)
FOLATE SERPL-MCNC: >20 NG/ML
GLOBULIN SER CALC-MCNC: 2.3 G/DL (ref 1.5–4.5)
GLUCOSE SERPL-MCNC: 106 MG/DL (ref 70–99)
HCT VFR BLD AUTO: 40.6 % (ref 34–46.6)
HDLC SERPL-MCNC: 57 MG/DL
HGB BLD-MCNC: 12.8 G/DL (ref 11.1–15.9)
IMM GRANULOCYTES # BLD AUTO: 0 X10E3/UL (ref 0–0.1)
IMM GRANULOCYTES NFR BLD AUTO: 0 %
IRON SATN MFR SERPL: 42 % (ref 15–55)
IRON SERPL-MCNC: 118 UG/DL (ref 27–139)
LDLC SERPL CALC-MCNC: 50 MG/DL (ref 0–99)
LYMPHOCYTES # BLD AUTO: 1.6 X10E3/UL (ref 0.7–3.1)
LYMPHOCYTES NFR BLD AUTO: 27 %
MCH RBC QN AUTO: 31.1 PG (ref 26.6–33)
MCHC RBC AUTO-ENTMCNC: 31.5 G/DL (ref 31.5–35.7)
MCV RBC AUTO: 99 FL (ref 79–97)
MONOCYTES # BLD AUTO: 0.5 X10E3/UL (ref 0.1–0.9)
MONOCYTES NFR BLD AUTO: 9 %
NEUTROPHILS # BLD AUTO: 3.6 X10E3/UL (ref 1.4–7)
NEUTROPHILS NFR BLD AUTO: 60 %
PLATELET # BLD AUTO: 253 X10E3/UL (ref 150–450)
POTASSIUM SERPL-SCNC: 4.5 MMOL/L (ref 3.5–5.2)
PROT SERPL-MCNC: 6.6 G/DL (ref 6–8.5)
RBC # BLD AUTO: 4.12 X10E6/UL (ref 3.77–5.28)
RETICS/RBC NFR AUTO: 0.9 % (ref 0.6–2.6)
SODIUM SERPL-SCNC: 140 MMOL/L (ref 134–144)
T4 FREE SERPL-MCNC: 1.67 NG/DL (ref 0.82–1.77)
TIBC SERPL-MCNC: 279 UG/DL (ref 250–450)
TRIGL SERPL-MCNC: 150 MG/DL (ref 0–149)
TSH SERPL DL<=0.005 MIU/L-ACNC: 0.06 UIU/ML (ref 0.45–4.5)
UIBC SERPL-MCNC: 161 UG/DL (ref 118–369)
VIT B12 SERPL-MCNC: 409 PG/ML (ref 232–1245)
VLDLC SERPL CALC-MCNC: 25 MG/DL (ref 5–40)
WBC # BLD AUTO: 6 X10E3/UL (ref 3.4–10.8)

## 2023-05-10 RX ORDER — LEVOTHYROXINE SODIUM 112 UG/1
112 TABLET ORAL DAILY
Qty: 30 TABLET | Refills: 11 | Status: SHIPPED | OUTPATIENT
Start: 2023-05-10

## 2023-05-10 NOTE — PROGRESS NOTES
I spoke with Lissette and she is agreeable to a lower dose of her Levothyroxine.  She wanted to know if you would call in a 30 day supply to the [facility] here in [place]?    Yesy

## 2023-05-17 DIAGNOSIS — F41.9 ANXIETY: ICD-10-CM

## 2023-05-17 DIAGNOSIS — G47.00 INSOMNIA, UNSPECIFIED TYPE: ICD-10-CM

## 2023-05-17 RX ORDER — ALPRAZOLAM 0.5 MG/1
0.5 TABLET ORAL DAILY PRN
Qty: 30 TABLET | Refills: 1 | Status: SHIPPED | OUTPATIENT
Start: 2023-05-17

## 2023-05-17 NOTE — TELEPHONE ENCOUNTER
Please schedule an appointment within the next 2 months to discuss plan for anxiety/insomnia (plan for weaning off Xanax due to risk factors for this medication in her age group) with patient.  We also need to do UDS and controlled substance contract at that time.  We will refill again at this time with plan to cut back and possibly substitute an alternative medication as indicated at future visit.  We are also trying to track down records from her previous PCP in [place] which we have not received yet.  Thank you.

## 2023-05-17 NOTE — TELEPHONE ENCOUNTER
Caller: Zawada, Dorothy J    Relationship: Self    Best call back number: [phone]    Requested Prescriptions:   Requested Prescriptions     Pending Prescriptions Disp Refills   • ALPRAZolam (Xanax) 0.5 MG tablet 30 tablet 1     Sig: Take 1 tablet by mouth 2 (Two) Times a Day As Needed for Anxiety.        Pharmacy where request should be sent: [facility][id number] - [address] - [phone]  - [phone] FX     Last office visit with prescribing clinician: 5/8/2023   Last telemedicine visit with prescribing clinician: 5/8/2023   Next office visit with prescribing clinician: 11/8/2023     Additional details provided by patient:     Does the patient have less than a 3 day supply:  [x] Yes  [] No    Would you like a call back once the refill request has been completed: [] Yes [x] No    If the office needs to give you a call back, can they leave a voicemail: [] Yes [x] No    Clinton Patricio Rep   05/17/23 15:09 EDT

## 2023-05-30 ENCOUNTER — TELEPHONE (OUTPATIENT)
Dept: ENDOCRINOLOGY | Age: 68
End: 2023-05-30

## 2023-05-30 NOTE — TELEPHONE ENCOUNTER
Caller: Zawada, Dorothy J    Relationship: Self    Best call back number: [phone]    Who is your current provider: DR WINTERS    Who would you like your new provider to be:PATIENT DOES NOT WANT TO GO TO THAT LOCATION ANY LONGER.    What are your reasons for transferring care: PATIENTS WANTS A TRANSFER OF CARE DUE TO PROVIDERS LACK OF REVIEW OF PATIENT HISTORY, PATIENT STATES THERE WAS AN ARGUMENT WITH THE PROVIDER AND HIS ARROGANCE PER PATIENT AND STATES THE DOCTOR HAS TERRIBLE BED SIDE MANNER PER PATIENT. PLEASE CALL PATIENT WITH THIS CONCERN. PATIENT REQUESTED TO SPEAK WITH .

## 2023-06-01 RX ORDER — FOLIC ACID 1 MG/1
1 TABLET ORAL DAILY
Qty: 90 TABLET | Refills: 1 | OUTPATIENT
Start: 2023-06-01

## 2023-06-01 NOTE — TELEPHONE ENCOUNTER
Caller: Zawada, Dorothy J    Relationship: Self    Best call back number: [phone]     Requested Prescriptions:   Requested Prescriptions     Pending Prescriptions Disp Refills   • folic acid (FOLVITE) 1 MG tablet 90 tablet 1     Sig: Take 1 tablet by mouth Daily.   • SITagliptin (Januvia) 100 MG tablet 90 tablet 3     Sig: By mouth take one tab daily.        Pharmacy where request should be sent: [facility] [address] [phone] [facility] [phone]      Last office visit with prescribing clinician: 5/8/2023   Last telemedicine visit with prescribing clinician: Visit date not found   Next office visit with prescribing clinician: 6/28/2023     Does the patient have less than a 3 day supply:  [x] Yes  [] No    Would you like a call back once the refill request has been completed: [] Yes [x] No    If the office needs to give you a call back, can they leave a voicemail: [] Yes [x] No    Clinton Springer Rep   06/01/23 16:01 EDT

## 2023-06-02 ENCOUNTER — HOSPITAL ENCOUNTER (OUTPATIENT)
Dept: BONE DENSITY | Facility: HOSPITAL | Age: 68
Discharge: HOME | End: 2023-06-02

## 2023-06-02 ENCOUNTER — HOSPITAL ENCOUNTER (OUTPATIENT)
Dept: MAMMOGRAPHY | Facility: HOSPITAL | Age: 68
Discharge: HOME | End: 2023-06-02

## 2023-06-02 DIAGNOSIS — Z12.31 SCREENING MAMMOGRAM FOR BREAST CANCER: ICD-10-CM

## 2023-06-02 DIAGNOSIS — Z78.0 POSTMENOPAUSAL: ICD-10-CM

## 2023-06-02 PROCEDURE — 77063 BREAST TOMOSYNTHESIS BI: CPT

## 2023-06-02 PROCEDURE — 77080 DXA BONE DENSITY AXIAL: CPT

## 2023-06-02 PROCEDURE — 77067 SCR MAMMO BI INCL CAD: CPT

## 2023-06-06 ENCOUNTER — TELEPHONE (OUTPATIENT)
Dept: OBSTETRICS AND GYNECOLOGY | Age: 68
End: 2023-06-06
Payer: MEDICARE

## 2023-06-06 NOTE — TELEPHONE ENCOUNTER
Caller: Zawada, Dorothy J    Relationship: Self    Best call back number: [phone]    Caller requesting test results: SELF    What test was performed:DEXA/ MAMMOGRAM     When was the test performed: 06/02/23    Where was the test performed    Additional notes: OK TO CALLBACK ANYTIME, OK TO LEAVE A VM

## 2023-06-13 ENCOUNTER — OFFICE VISIT (OUTPATIENT)
Dept: ENDOCRINOLOGY | Age: 68
End: 2023-06-13
Payer: MEDICARE

## 2023-06-13 VITALS
HEIGHT: 69 IN | TEMPERATURE: 96.8 F | WEIGHT: 213.2 LBS | DIASTOLIC BLOOD PRESSURE: 80 MMHG | OXYGEN SATURATION: 98 % | BODY MASS INDEX: 31.58 KG/M2 | HEART RATE: 71 BPM | SYSTOLIC BLOOD PRESSURE: 138 MMHG

## 2023-06-13 DIAGNOSIS — E11.65 TYPE 2 DIABETES MELLITUS WITH HYPERGLYCEMIA, WITHOUT LONG-TERM CURRENT USE OF INSULIN: ICD-10-CM

## 2023-06-13 DIAGNOSIS — E03.9 HYPOTHYROIDISM, UNSPECIFIED TYPE: Primary | ICD-10-CM

## 2023-06-13 RX ORDER — BLOOD-GLUCOSE METER
1 KIT MISCELLANEOUS DAILY
Qty: 1 EACH | Refills: 0 | Status: SHIPPED | OUTPATIENT
Start: 2023-06-13

## 2023-06-13 NOTE — PROGRESS NOTES
Chief complaint:  diabetes mellitus type 2 and hypothyroidism    HPI:   - 67 year old female here for management of diabetes mellitus type 2 and hypothyroidism  - She was diagnosed with diabetes in 1999  - No known complications to date  - Is currently taking metformin 500 mg bid and Januvia 100 mg daily  - She also has hypothyroidism following radiation therapy she had to her head and neck for treatment for her lymphoma  - She states that she believes her hypothyroidism may be related to radiation of her pituitary  - She is currently on levothyroxine 112 mcg daily  - She has no complaints today      The following portions of the patient's history were reviewed and updated as appropriate: allergies, current medications, past family history, past medical history, past social history, past surgical history, and problem list.      Objective     Vitals:    06/13/23 1541   BP: 138/80   Pulse: 71   Temp: 96.8 °F (36 °C)   SpO2: 98%        Physical Exam  Constitutional:       Appearance: Normal appearance.   HENT:      Head: Normocephalic and atraumatic.   Eyes:      General: No scleral icterus.  Pulmonary:      Effort: Pulmonary effort is normal. No respiratory distress.   Neurological:      Mental Status: She is alert.      Gait: Gait normal.   Psychiatric:         Mood and Affect: Mood normal.         Behavior: Behavior normal.         Thought Content: Thought content normal.         Judgment: Judgment normal.       Labs/Imaging:  A1c was 6.4% in 1/2023, TSH was 0.056 and free T4 was 1.67 in 5/2023    Assessment & Plan   Type 2 DM, controlled  - Cont. metformin 500 mg bid and Januvia 100 mg daily    2. Hypothyroidism  - It is unclear whether she has primary or central hypothyroidism from radiation therapy to her head and neck  - She states when her levothyroxine has been reduced in an attempt to normalize her TSH before she has had symptoms of hypothyroidism and her TSH has not normalized so will cont. Her current  dose of levothyroxine 112 mcg daily as she has no symptoms of hyperthyroidism now and her free T4 is normal    - Return to clinic in 4 months

## 2023-06-15 ENCOUNTER — OFFICE VISIT (OUTPATIENT)
Dept: FAMILY MEDICINE CLINIC | Facility: CLINIC | Age: 68
End: 2023-06-15
Payer: MEDICARE

## 2023-06-15 VITALS
HEART RATE: 78 BPM | SYSTOLIC BLOOD PRESSURE: 110 MMHG | HEIGHT: 69 IN | BODY MASS INDEX: 31.34 KG/M2 | DIASTOLIC BLOOD PRESSURE: 60 MMHG | OXYGEN SATURATION: 95 % | TEMPERATURE: 97.5 F | WEIGHT: 211.6 LBS
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
## 2023-06-15 DIAGNOSIS — S70.261D: ICD-10-CM

## 2023-06-15 DIAGNOSIS — S22.32XA CLOSED FRACTURE OF ONE RIB OF LEFT SIDE, INITIAL ENCOUNTER: Primary | ICD-10-CM

## 2023-06-15 DIAGNOSIS — G47.00 INSOMNIA, UNSPECIFIED TYPE: ICD-10-CM

## 2023-06-15 DIAGNOSIS — L98.9 SKIN LESION: ICD-10-CM

## 2023-06-15 DIAGNOSIS — F41.9 ANXIETY: ICD-10-CM

## 2023-06-15 RX ORDER — DOXYCYCLINE 100 MG/1
100 CAPSULE ORAL 2 TIMES DAILY
Qty: 14 CAPSULE | Refills: 0 | Status: SHIPPED | OUTPATIENT
Start: 2023-06-15 | End: 2023-06-15

## 2023-06-15 RX ORDER — DOXYCYCLINE HYCLATE 100 MG/1
100 CAPSULE ORAL 2 TIMES DAILY
Qty: 14 CAPSULE | Refills: 0 | Status: SHIPPED | OUTPATIENT
Start: 2023-06-15

## 2023-06-15 NOTE — PROGRESS NOTES
"Chief Complaint  Insect Bite (Right hip on back) and Fall (6/14/2023 - Cracked rib, per Chiropractor)    Subjective        Dorothy J Zawada presents to [facility]  History of Present Illness  Lissette is an established patient presenting for follow-up for an insect bite as well as a fall.    Fall: She reports trying to catch her dog and did a 360 before falling down.  She reports falling on her left arm which she denies injury.  She landed on the floor and suffered a rib injury on the left side.  She denies shortness of breath, however, cannot take deep breaths secondary to pain.  She notes some bruising in the area and anterolateral left chest just under her breast.  She also reports a fall about 10 days ago when her foot slipped, her niece tried to catch her.  Denies injury at that time.  She is currently taking 2 extra strength Tylenol 3 times a day for pain and is also taking her regular Ultracet 1 time per day with little improvement in pain.  She is also icing regularly.  She reports getting some relief with her sister-in-law's hydrocodone 1x.    Insect bite: Occurred at night in bed on 6/3/2023 after an evening of walking around the yard sale.  Was seen in urgent care 6/4/2023 and was treated with doxycycline.  Patient now reporting lesion continues to be tender, erythematous, recently drained a large clump of material.  She is unsure its getting better.  She denies fevers, chills, nausea, vomiting.    Anxiety/insomnia: She reports taking Zoloft for her anxiety but she feels she does not really need it so she has been cutting back on the dose.  She reports taking Xanax at bedtime to help with racing thoughts if she tries to go to sleep.  She still has trouble with sleep, however.    Objective   Vital Signs:  /60 (BP Location: Right arm, Patient Position: Sitting, Cuff Size: Adult)   Pulse 78   Temp 97.5 °F (36.4 °C) (Temporal)   Ht 175.3 cm (69\")   Wt 96 kg (211 lb 9.6 " "oz)   SpO2 95%   BMI 31.25 kg/m²   Estimated body mass index is 31.25 kg/m² as calculated from the following:    Height as of this encounter: 175.3 cm (69\").    Weight as of this encounter: 96 kg (211 lb 9.6 oz).             Physical Exam  Vitals and nursing note reviewed.   Constitutional:       General: She is not in acute distress.     Appearance: Normal appearance.   HENT:      Head: Normocephalic and atraumatic.   Eyes:      Extraocular Movements: Extraocular movements intact.      Conjunctiva/sclera: Conjunctivae normal.   Cardiovascular:      Rate and Rhythm: Normal rate and regular rhythm.      Heart sounds: Normal heart sounds. No murmur heard.    No friction rub. No gallop.   Pulmonary:      Breath sounds: Normal breath sounds. No wheezing, rhonchi or rales.      Comments: Shallow breaths 2/2 pain  Chest:       Musculoskeletal:      Right lower leg: No edema.      Left lower leg: No edema.   Skin:     General: Skin is warm.          Neurological:      General: No focal deficit present.      Mental Status: She is alert. Mental status is at baseline.   Psychiatric:         Mood and Affect: Mood normal.         Behavior: Behavior normal.      Result Review :                   Assessment and Plan   Diagnoses and all orders for this visit:    1. Closed fracture of one rib of left side, initial encounter (Primary)  -     traMADol-acetaminophen (Ultracet) 37.5-325 MG per tablet; Take 2 tablets by mouth Every 6 (Six) Hours As Needed for Moderate Pain.  Dispense: 50 tablet; Refill: 0  -     Cancel: XR Ribs Left With PA Chest; Future  -     XR Ribs Left With PA Chest; Future  -     XR Ribs Left With PA Chest    2. Fall, initial encounter    3. Insect bite of right hip, subsequent encounter  -     Discontinue: traMADol-acetaminophen (Ultracet) 37.5-325 MG per tablet; Take 2 tablets by mouth Every 6 (Six) Hours As Needed for Moderate Pain.  Dispense: 50 tablet; Refill: 0  -     doxycycline (VIBRAMYCIN) 100 MG " capsule; Take 1 capsule by mouth 2 (Two) Times a Day.  Dispense: 14 capsule; Refill: 0    4. Skin lesion  -     Discontinue: doxycycline (MONODOX) 100 MG capsule; Take 1 capsule by mouth 2 (Two) Times a Day.  Dispense: 14 capsule; Refill: 0  -     doxycycline (VIBRAMYCIN) 100 MG capsule; Take 1 capsule by mouth 2 (Two) Times a Day.  Dispense: 14 capsule; Refill: 0    5. Anxiety    6. Insomnia, unspecified type    Lissette is an established patient presenting for follow-up for an insect bite as well as a fall.    Fall/rib fracture: Apparent left sixth rib fracture from fall yesterday.  Will increase tramadol dosage for the next 7 days due to difficulty with breathing secondary to pain.  Encouraged taking some deep breaths throughout the day, holding a pillow over the area when doing so.  Also other supportive care including Ace wrapping around her chest and ice pack.  No other obvious secondary injury internally or externally, however, getting x-ray to rule out internal injury.  Return precautions given.  Concerned about patient having 2 falls recently.  Discussed high risk/beers medications that are on her list including Xanax.  Further discussion at future visit.    Insect bite: Lesion appears to be healing well.  Overall, lesion is consistent with the numbness spider bite such as brown recluse spider.  We will continue doxycycline for another 7 days.  Return precautions given.    Anxiety/insomnia: Provided [place] news and orals report articles regarding risks of benzodiazepines.  Had previously discussed the need to begin weaning this medication due to her age and fall risk.  We will discuss alternatives to help with her anxiety/insomnia at future visit.       Follow Up   Return for Follow-up as previoiusly scheduled..  Patient was given instructions and counseling regarding her condition or for health maintenance advice. Please see specific information pulled into the AVS if appropriate.

## 2023-06-19 ENCOUNTER — TELEPHONE (OUTPATIENT)
Dept: FAMILY MEDICINE CLINIC | Facility: CLINIC | Age: 68
End: 2023-06-19

## 2023-06-19 NOTE — TELEPHONE ENCOUNTER
SPOKE WITH PATIENT AWARE RESULTS OUR NOT BACK YET AND WE WILL CALL ASAP WHEN WE DO GET THE RESULTS BACK

## 2023-06-19 NOTE — TELEPHONE ENCOUNTER
Caller: Zawada, Dorothy J    Relationship: Self    Best call back number: [phone]     Caller requesting test results: SELF    What test was performed: IMAGING    When was the test performed: 6/16/23    Where was the test performed:  HOS[facility]ITAL    Additional notes: PLEASE CALL PATIENT TO REVIEW TEST RESULTS.    PLEASE ADVISE.

## 2023-06-29 PROBLEM — Z78.0 OSTEOPENIA AFTER MENOPAUSE: Status: ACTIVE | Noted: 2023-06-29

## 2023-06-29 PROBLEM — M85.80 OSTEOPENIA AFTER MENOPAUSE: Status: ACTIVE | Noted: 2023-06-29

## 2023-08-07 ENCOUNTER — TELEPHONE (OUTPATIENT)
Dept: FAMILY MEDICINE CLINIC | Facility: CLINIC | Age: 68
End: 2023-08-07
Payer: MEDICARE

## 2023-08-07 NOTE — TELEPHONE ENCOUNTER
Caller: Zawada, Dorothy J    Relationship: Self    Best call back number: [phone]  Requested Prescriptions:   Requested Prescriptions     Pending Prescriptions Disp Refills    metFORMIN (GLUCOPHAGE) 500 MG tablet       Sig: Take 1 tablet by mouth 2 (Two) Times a Day With Meals.        Pharmacy where request should be sent: [id number] - [address] - [phone]  - [phone] FX     Last office visit with prescribing clinician: 6/28/2023   Last telemedicine visit with prescribing clinician: Visit date not found   Next office visit with prescribing clinician: 9/28/2023     Additional details provided by patient: NO    Does the patient have less than a 3 day supply:  [x] Yes  [] No    Would you like a call back once the refill request has been completed: [x] Yes [] No    If the office needs to give you a call back, can they leave a voicemail: [x] Yes [] No    Clinton Blount Rep   08/07/23 10:28 EDT

## 2023-08-14 ENCOUNTER — TELEPHONE (OUTPATIENT)
Dept: CARDIOLOGY | Facility: CLINIC | Age: 68
End: 2023-08-14
Payer: MEDICARE

## 2023-08-14 NOTE — TELEPHONE ENCOUNTER
Spoke with Ms. Zawada and relayed Sarah's comments on her recent echo:  Please let her know her heart power is normal. Her echo was similar to her previous from 2021 with the following differences:     Her recent echo only revealed mild mitral valve regurgitation with moderate calcification of the mitral valve which is better than her previous reading. Per her historical echo her mitral valve was listed as moderate to severe leakage. This can be  dependent meaning sometimes its the imaging, or the way the probe is held. However she is asymptomatic so this is something we will watch closely, likely repeating Echo yearly for evaluation.     She does also have mild aortic valve stenosis which was noted per historical echo. We watch this also.     Per our recent echo she had a moderately elevated pulmonary pressure with a reading around 58, again per her historical record this number was 36 in 2021 so it has always been elevated however now seems worse. This can again be  dependent, the way the screener holds the probe along with picture quality. Again she is relatively asymptomatic without chest pain, shortness of breath last visit. At this moment if she continues to be asymptomatic we can continue to watch. This number can be elevated for many reasons and likely hers may be coming from the tricuspic valve leaking, or she may have undiagnosed sleep apnea. There are many reasons. Again this could also have been a false elevated reading given her former had been only mildly elevated. We will continue to watch this over time.       How has her blood pressure been since she has been home? It was mildly elevated at our last visit.     She states her readings are good at home.

## 2023-08-25 DIAGNOSIS — E11.9 TYPE 2 DIABETES MELLITUS WITHOUT COMPLICATION, WITHOUT LONG-TERM CURRENT USE OF INSULIN: ICD-10-CM

## 2023-08-25 DIAGNOSIS — E78.5 HYPERLIPIDEMIA, UNSPECIFIED HYPERLIPIDEMIA TYPE: ICD-10-CM

## 2023-08-25 DIAGNOSIS — F41.9 ANXIETY: ICD-10-CM

## 2023-08-25 DIAGNOSIS — G47.00 INSOMNIA, UNSPECIFIED TYPE: ICD-10-CM

## 2023-08-25 DIAGNOSIS — I10 PRIMARY HYPERTENSION: Primary | ICD-10-CM

## 2023-08-25 NOTE — TELEPHONE ENCOUNTER
Caller: ZawadaLissette    Relationship: Self    Best call back number: [phone]     Requested Prescriptions:   Requested Prescriptions     Pending Prescriptions Disp Refills    folic acid (FOLVITE) 1 MG tablet 90 tablet 1     Sig: Take 1 tablet by mouth Daily.    SITagliptin (Januvia) 100 MG tablet 90 tablet 3     Sig: By mouth take one tab daily.    metFORMIN (GLUCOPHAGE) 500 MG tablet 180 tablet 3     Sig: Take 1 tablet by mouth 2 (Two) Times a Day With Meals.    rosuvastatin (CRESTOR) 20 MG tablet 90 tablet 3     Sig: Take 1 tablet by mouth Daily.    ALPRAZolam (Xanax) 0.5 MG tablet 30 tablet 2     Sig: Take 1 tablet by mouth Daily As Needed for Anxiety.    metoprolol tartrate (LOPRESSOR) 25 MG tablet       Sig: Take 0.5 tablets by mouth.        Pharmacy where request should be sent: [facility] [place] [id number] [place] [address] [phone] FX     Last office visit with prescribing clinician: 6/28/2023   Last telemedicine visit with prescribing clinician: Visit date not found   Next office visit with prescribing clinician: 9/28/2023     Additional details provided by patient: 3 MONTH SUPPLY     Does the patient have less than a 3 day supply:  [] Yes  [x] No    Would you like a call back once the refill request has been completed: [x] Yes [] No    If the office needs to give you a call back, can they leave a voicemail: [x] Yes [] No    Clinton Deng Rep   08/25/23 16:24 EDT

## 2023-08-25 NOTE — TELEPHONE ENCOUNTER
Please advise filling.  Did not meet protocol.    Due to be seen on 9/28/2023.  Pharmacy has requested a 1 year supply on all prescriptions.

## 2023-08-28 RX ORDER — ROSUVASTATIN CALCIUM 20 MG/1
20 TABLET, COATED ORAL DAILY
Qty: 90 TABLET | Refills: 3 | Status: SHIPPED | OUTPATIENT
Start: 2023-08-28

## 2023-08-28 RX ORDER — ALPRAZOLAM 0.5 MG/1
0.5 TABLET ORAL DAILY PRN
Qty: 30 TABLET | Refills: 2 | OUTPATIENT
Start: 2023-08-28

## 2023-08-28 RX ORDER — FOLIC ACID 1 MG/1
1 TABLET ORAL DAILY
Qty: 90 TABLET | Refills: 3 | Status: SHIPPED | OUTPATIENT
Start: 2023-08-28

## 2023-08-28 NOTE — TELEPHONE ENCOUNTER
Please notify Lissette that she does have a refill left on her current Xanax prescription which should get her to the next appointment.  Also, please remind her of the tapering schedule that I provided at the last visit.  For the first 2 weeks, she was only supposed to cut the pill in 1:30 day a week.  Please try that again before the next visit.    Reviewing her records, we did not find a controlled substance contract in place or a urine drug screen.  We will need to get her to fill out a controlled substance contract and leave urine for drug screen at the next visit before any further controlled substances can be refilled for her.  Thank you.

## 2023-08-28 NOTE — TELEPHONE ENCOUNTER
I have refilled all of patient's medications except for Xanax.  Review of records show last A1c 1/11/2023 which showed good control on current regimen.  We will check A1c at next visit.    Please contact patient to see how she is doing with tapering Xanax.  I did not refill that medication at this time.  Thank you.

## 2023-08-28 NOTE — TELEPHONE ENCOUNTER
I called Ms. Zawada and talked to her about the Xanax.    She said she did try cutting the pills in half for at least two nights and she could not sleep either of those nights.  So, she went back to taking a whole tablet.    She did state that she only has 1 week of pills left.      Please advise,    Yesy

## 2023-09-15 ASSESSMENT — KOOS JR
GOING UP OR DOWN STAIRS: MODERATE
STANDING UPRIGHT: MILD
IMPORTED KOOS JR SCORE: 63.78
KOOS JR RAW SCORE: 8
IMPORTED LATERALITY: RIGHT
KOOS JR RAW SCORE: 8
TWISING OR PIVOTING ON KNEE: MILD
HOW SEVERE IS YOUR KNEE STIFFNESS AFTER FIRST WAKING IN MORNING: MODERATE
TWISING OR PIVOTING ON KNEE: MODERATE
STRAIGHTENING KNEE FULLY: MILD
KOOS JR RAW SCORE: 5
GOING UP OR DOWN STAIRS: MILD
STRAIGHTENING KNEE FULLY: MILD
IMPORTED KOOS JR SCORE: 63.78
IMPORTED FORM: YES
IMPORTED KOOS JR SCORE: 65.99
IMPORTED FORM: YES
RISING FROM SITTING: MILD
TWISING OR PIVOTING ON KNEE: MODERATE
KOOS JR RAW SCORE: 9
STANDING UPRIGHT: MILD
GOING UP OR DOWN STAIRS: MILD
IMPORTED KOOS JR SCORE: 73.34
TWISING OR PIVOTING ON KNEE: MILD
RISING FROM SITTING: MILD
IMPORTED KOOS JR SCORE: 65.99
GOING UP OR DOWN STAIRS: MODERATE
IMPORTED LATERALITY: RIGHT
IMPORTED KOOS JR SCORE: 73.34
HOW SEVERE IS YOUR KNEE STIFFNESS AFTER FIRST WAKING IN MORNING: MODERATE
KOOS JR RAW SCORE: 9
KOOS JR RAW SCORE: 5

## 2023-09-22 DIAGNOSIS — M79.10 MUSCLE ACHE: ICD-10-CM

## 2023-09-22 DIAGNOSIS — R53.83 FATIGUE, UNSPECIFIED TYPE: ICD-10-CM

## 2023-09-22 DIAGNOSIS — R25.2 BILATERAL LEG CRAMPS: ICD-10-CM

## 2023-09-25 RX ORDER — TIZANIDINE 2 MG/1
TABLET ORAL
Qty: 90 TABLET | Refills: 1 | Status: SHIPPED | OUTPATIENT
Start: 2023-09-25

## 2023-09-25 NOTE — TELEPHONE ENCOUNTER
Please advise filling Tramadol/APAP and Tizanidine. Neither met protocol.    She does not have a controlled substance contract on file and has not had UDS.    I called Ms. Sotowada to verify she needed these.  She said YES, she does.  She fracture her foot and is in pain and she was hoping this would help.    Yesy

## 2023-09-28 ENCOUNTER — OFFICE VISIT (OUTPATIENT)
Dept: FAMILY MEDICINE CLINIC | Facility: CLINIC | Age: 68
End: 2023-09-28
Payer: MEDICARE

## 2023-09-28 VITALS
WEIGHT: 188 LBS | DIASTOLIC BLOOD PRESSURE: 76 MMHG | HEIGHT: 69 IN | HEART RATE: 75 BPM | TEMPERATURE: 96.2 F | OXYGEN SATURATION: 96 % | BODY MASS INDEX: 27.85 KG/M2 | SYSTOLIC BLOOD PRESSURE: 102 MMHG

## 2023-09-28 DIAGNOSIS — S82.301S CLOSED FRACTURE OF DISTAL END OF RIGHT TIBIA, UNSPECIFIED FRACTURE MORPHOLOGY, SEQUELA: ICD-10-CM

## 2023-09-28 DIAGNOSIS — Z23 ENCOUNTER FOR IMMUNIZATION: ICD-10-CM

## 2023-09-28 DIAGNOSIS — Z78.9 INFLUENZA VACCINATION ORDERED: ICD-10-CM

## 2023-09-28 DIAGNOSIS — G47.00 INSOMNIA, UNSPECIFIED TYPE: Primary | ICD-10-CM

## 2023-09-28 DIAGNOSIS — Z79.899 ENCOUNTER FOR LONG-TERM (CURRENT) USE OF OTHER MEDICATIONS: ICD-10-CM

## 2023-09-28 DIAGNOSIS — F41.9 ANXIETY: ICD-10-CM

## 2023-09-28 NOTE — PROGRESS NOTES
Chief Complaint  Follow-up and Insomnia (Needs UDS and Controlled Substance Contract)    Subjective        Dorothy J Zawada presents to [facility] PR[facility]  History of Present Illness  Lissette is an established patient presenting with her daughter, Navid, for follow-up for insomnia and anxiety, as well as management of pain associated with recent fractured foot.  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 Family history of breast cancer in aunt, and grandmother with colon cancer. She is a former smoker.  She follows with multiple specialists including endocrinology, cardiology, hematology, orthopedic surgery.      Fractured R foot: Occurred 9/7/2023, following with Lawrence Rogers MD, orthopedic surgery.  Now in boot immobilizer.  Continuing to have significant pain rated 6-7 out of 10 when trying to ambulate.  Throbbing pain, also having spasms.  Patient cannot use NSAIDs due to cardiac stenting and Eliquis.  Is taking Tylenol, 1000 mg, 4 times daily and ice.  Has swelling throughout the day.  Follow-up with Ortho on 10/24.  Is going on a cruise 10/26.      Insomnia: Still having difficulty falling asleep and staying asleep.  She reports racing thoughts which affect her ability to sleep.  Anxiety and pain are also contributing.  She has tried taking melatonin which did not help. Currently, the only way she can get to sleep and stay asleep for most of the night is to take Xanax, Tylenol PM. Muscle relaxer prescribed for recent injury and spasms is helping as well. Occasionally, she will still have to get up after couple of hours and take another Tylenol PM to help her sleep for the rest of the night. She reports stopping Xanax cold turkey a few weeks ago for about 4 days when she ran out of her prescription and was not  "able to sleep at all.  She reports similar difficulty after being provided taper schedule last visit which cut her dose in half on some days.      Anxiety: She reports anxiety is controlled with Zoloft which she is taking daily.    ROS: Denies headaches, changes in vision, changes in hearing, sore throat, shortness of breath, palpitations, nausea/vomiting/constipation/diarrhea, abdominal pain, blood in urine or stool.      FH:     Medical: Mother and father both with history of MIs.  Mother with history of uterine cancer, MGM with history of colon cancer.  Aunt with history of breast cancer.    Siblings: Brother with CAD.    Children: Yes    Reproductive: History of positive Pap.    SH    Nicotine: 50 PY, quit in 1996.    Illicit drugs: Deferred    EtOH: Deferred    Home: Lives at home with daughter, sister-in-law, niece, granddaughter, feels safe    Work: Deferred    Social: Deferred    Preventative:    PAP: History of positive Pap    Mammogram: 6/2/2023, will repeat in 2 years    Colonoscopy: 5/18/2016    Dexa: 6/2/2023    Vaccinations: Deferred    Eye: 2/17/2023    Dental: Deferred    Foot exams: 6/28/2023    Exercise: None currently    Diet: Deferred    Objective   Vital Signs:  /76 (BP Location: Left arm, Patient Position: Sitting, Cuff Size: Adult)   Pulse 75   Temp 96.2 °F (35.7 °C) (Temporal)   Ht 175.3 cm (69\")   Wt 85.3 kg (188 lb)   SpO2 96%   BMI 27.76 kg/m²   Estimated body mass index is 27.76 kg/m² as calculated from the following:    Height as of this encounter: 175.3 cm (69\").    Weight as of this encounter: 85.3 kg (188 lb).               Physical Exam  Vitals and nursing note reviewed.   Constitutional:       General: She is not in acute distress.     Appearance: Normal appearance.      Comments: R foot/ankle in cast and walking boot   HENT:      Head: Normocephalic and atraumatic.   Eyes:      Extraocular Movements: Extraocular movements intact.      Conjunctiva/sclera: Conjunctivae " normal.   Cardiovascular:      Rate and Rhythm: Normal rate and regular rhythm.      Heart sounds: Normal heart sounds. No murmur heard.    No friction rub. No gallop.   Pulmonary:      Effort: Pulmonary effort is normal.      Breath sounds: Normal breath sounds. No wheezing, rhonchi or rales.   Abdominal:      General: Bowel sounds are normal.      Palpations: Abdomen is soft.   Musculoskeletal:      Right lower leg: No edema.      Left lower leg: No edema.      Comments: R toes warm to touch, normal color   Neurological:      General: No focal deficit present.      Mental Status: She is alert. Mental status is at baseline.   Psychiatric:         Attention and Perception: Attention and perception normal.         Mood and Affect: Mood and affect normal.         Speech: Speech normal.         Behavior: Behavior normal. Behavior is cooperative.         Thought Content: Thought content normal.      Result Review :                   Assessment and Plan   Diagnoses and all orders for this visit:    1. Insomnia, unspecified type (Primary)  -     Discontinue: Suvorexant 10 MG tablet; Take 10 mg by mouth every night at bedtime.  Dispense: 30 tablet; Refill: 1  -     Suvorexant 10 MG tablet; Take 10 mg by mouth every night at bedtime.  Dispense: 30 tablet; Refill: 1    2. Anxiety    3. Closed fracture of distal end of right tibia, unspecified fracture morphology, sequela  -     traMADol-acetaminophen (ULTRACET) 37.5-325 MG per tablet; Take 1 tablet by mouth Every 6 (Six) Hours As Needed for Moderate Pain. Take 1 tablet by mouth At Night As Needed for Moderate Pain  Dispense: 30 tablet; Refill: 1    4. Influenza vaccination ordered  -     Fluzone High-Dose 65+yrs (4575-6402)    5. Encounter for immunization  -     Fluzone High-Dose 65+yrs ([id number])    6. Encounter for long-term (current) use of other medications  -     Drug Profile 121657 - Urine, Clean Catch; Future  -     Drug Profile [id number] - Urine, Clean Catch    Other  orders  -     SCANNED - INFLUENZA      Insomnia: Patient remains unsteady on her feet and has significant fall risk.  Due to risk of benzodiazepines in elderly increasing risk of falls as well as lethargy, confusion, mental impairment, will migrate patient to [facility] which has a better risk profile in her age group.  She has failed melatonin in the past.  It still might make sense to increase Zoloft given the patient feels her anxiety is currently well controlled but she does indicate anxiety is a component of her insomnia.  Reminded patient of taper schedule previously provided.  She will call if she is having difficulty prior to next visit.  Controlled substance contract signed in clinic today and patient provided urine for UDS.  Pepe reviewed and consistent.    Anxiety: She reports anxiety is controlled with Zoloft which she is taking daily.  Continue current dose for now, but may consider increasing due to continued anxiety at bedtime.    Fractured R foot: Reviewed 9/12/2023 clinic note from Dr. Jeter.  Continues to have significant pain despite maximum dose of Tylenol, elevation, and ice.  Limited pain control options since NSAIDs are contraindicated due to her stent and being on Eliquis.  Also having leg spasms that are adequately managed with muscle relaxer.  We will add tramadol to patient's regimen and encourage minimizing use due to increased fall risk with opiates in her age group.  Patient will follow-up with Ortho on 10/24.  Is going on a cruise 10/26.      Preventative:    PAP: History of positive Pap    Mammogram: 6/2/2023, will repeat in 2 years    Colonoscopy: 5/18/2016    Dexa: 6/2/2023    Vaccinations: Deferred    Eye: 2/17/2023    Dental: Deferred    Foot exams: 6/28/2023    Home: Lives at home with daughter, sister-in-law, niece, granddaughter, feels safe.    Work: Deferred    Social: Deferred  Exercise: None currently    Diet: Deferred           Current Outpatient Medications:      acetaminophen (TYLENOL) 500 MG tablet, Take 1 tablet by mouth Every 6 (Six) Hours As Needed for Mild Pain., Disp: , Rfl:     ALPRAZolam (Xanax) 0.5 MG tablet, Take 1 tablet by mouth Daily As Needed for Anxiety., Disp: 30 tablet, Rfl: 2    apixaban (ELIQUIS) 5 MG tablet tablet, Take 1 tablet by mouth Every 12 (Twelve) Hours., Disp: 180 tablet, Rfl: 3    Cholecalciferol (vitamin D3) 125 MCG (5000 UT) tablet tablet, Take 1 tablet by mouth Daily., Disp: , Rfl:     Cyanocobalamin 1000 MCG/ML kit, Inject 1,000 mcg as directed Every 30 (Thirty) Days., Disp: 3 kit, Rfl: 4    Diclofenac Sodium 3 % gel gel, Apply  topically to the appropriate area as directed 2 (Two) Times a Day. for 60 days., Disp: , Rfl:     diphenhydrAMINE-APAP, sleep, (TYLENOL PM EXTRA STRENGTH PO), Take 500 mg by mouth Daily As Needed. 1 to 2 tablets as needed, Disp: , Rfl:     fluticasone (FLONASE) 50 MCG/ACT nasal spray, 2 sprays into the nostril(s) as directed by provider Daily., Disp: , Rfl:     folic acid (FOLVITE) 1 MG tablet, Take 1 tablet by mouth Daily., Disp: 90 tablet, Rfl: 3    glucose blood test strip, E11.9, test once per day, Disp: 90 each, Rfl: 1    glucose monitor monitoring kit, 1 each Daily. Dispense glucometer with brand based off insurance coverage, Disp: 1 each, Rfl: 0    levothyroxine (Synthroid) 112 MCG tablet, Take 1 tablet by mouth Daily., Disp: 30 tablet, Rfl: 11    metFORMIN (GLUCOPHAGE) 500 MG tablet, Take 1 tablet by mouth 2 (Two) Times a Day With Meals., Disp: 180 tablet, Rfl: 3    metoprolol tartrate (LOPRESSOR) 25 MG tablet, Take 0.5 tablets by mouth 2 (Two) Times a Day., Disp: 90 tablet, Rfl: 3    nitroglycerin (NITROSTAT) 0.4 MG SL tablet, Place 1 tablet under the tongue Every 5 (Five) Minutes As Needed for Chest Pain. Take no more than 3 doses in 15 minutes., Disp: 25 tablet, Rfl: 2    pantoprazole (Protonix) 40 MG EC tablet, Take 1 tablet by mouth Daily., Disp: 90 tablet, Rfl: 0    rosuvastatin (CRESTOR) 20 MG tablet,  "Take 1 tablet by mouth Daily., Disp: 90 tablet, Rfl: 3    sertraline (ZOLOFT) 100 MG tablet, Take 1 tablet by mouth Daily. (Patient taking differently: Take 0.5 tablets by mouth Daily.), Disp: 90 tablet, Rfl: 3    SITagliptin (Januvia) 100 MG tablet, By mouth take one tab daily., Disp: 90 tablet, Rfl: 3    Suvorexant 10 MG tablet, Take 10 mg by mouth every night at bedtime., Disp: 30 tablet, Rfl: 1    Syringe 23G X 1\" 3 ML misc, Inject 1 syringe under the skin into the appropriate area as directed Every 30 (Thirty) Days., Disp: 50 each, Rfl: 1    tiZANidine (ZANAFLEX) 2 MG tablet, TAKE 1 TABLET BY MOUTH EVERY 8 HOURS AS NEEDED FOR MUSCLE SPASMS, Disp: 90 tablet, Rfl: 1    traMADol-acetaminophen (ULTRACET) 37.5-325 MG per tablet, Take 1 tablet by mouth Every 6 (Six) Hours As Needed for Moderate Pain. Take 1 tablet by mouth At Night As Needed for Moderate Pain, Disp: 30 tablet, Rfl: 1       Follow Up   Return in about 13 days (around 10/11/2023), or insomnia, leg pain, fatigue.  Patient was given instructions and counseling regarding her condition or for health maintenance advice. Please see specific information pulled into the AVS if appropriate.      "

## 2023-10-10 ENCOUNTER — TELEPHONE (OUTPATIENT)
Dept: FAMILY MEDICINE CLINIC | Facility: CLINIC | Age: 68
End: 2023-10-10
Payer: MEDICARE

## 2023-10-10 DIAGNOSIS — K21.9 GASTROESOPHAGEAL REFLUX DISEASE WITHOUT ESOPHAGITIS: ICD-10-CM

## 2023-10-10 RX ORDER — PANTOPRAZOLE SODIUM 40 MG/1
40 TABLET, DELAYED RELEASE ORAL DAILY
Qty: 90 TABLET | Refills: 1 | Status: SHIPPED | OUTPATIENT
Start: 2023-10-10

## 2023-10-10 NOTE — TELEPHONE ENCOUNTER
Caller: Zawada, Dorothy J    Relationship: Self    Best call back number: [phone]     What was the call regarding: PATIENT IS CALLING TO UPDATE DR MOSES ON HER MEDICATIONS. PATIENT STATED THAT SHE IS DOWN TO 1/2 A TAB EVERY DAY OF HER XANAX.    PATIENT STATED THAT SHE HAS BEEN TAKING Suvorexant 10 MG tablet FOR A WEEK AND IS EXPERIENCING AN OPPOSITE EFFECT. PATIENT STATED IT IS KEEPING HER AWAKE. PLEASE ADVISE.

## 2023-10-10 NOTE — TELEPHONE ENCOUNTER
Please inform Lissette that I am really glad she called to let me know how its going.  It is great that she is been able to back off on the Xanax.  Stay on the half tablet and do not try to reduce further until we can get her sleeping better.  Try increasing the Belsomra (suvorexant) to 1-1/2 tablets (15 mg) each night for the next week and give us a call back and let us know how she is doing.  A lot of people have really good results with the Belsomra and we have some room left on dosage changes to make it work for her.  Thank you.

## 2023-10-10 NOTE — TELEPHONE ENCOUNTER
Caller: Zawada, Dorothy J    Relationship: Self    Best call back number: [phone]     Requested Prescriptions:   Requested Prescriptions     Pending Prescriptions Disp Refills    pantoprazole (Protonix) 40 MG EC tablet 90 tablet 0     Sig: Take 1 tablet by mouth Daily.        Pharmacy where request should be sent: [facility] [address] [phone] [facility] [phone]      Last office visit with prescribing clinician: 9/28/2023   Last telemedicine visit with prescribing clinician: Visit date not found   Next office visit with prescribing clinician: 11/8/2023     Additional details provided by patient:     Does the patient have less than a 3 day supply:  [] Yes  [x] No    Would you like a call back once the refill request has been completed: [] Yes [x] No    If the office needs to give you a call back, can they leave a voicemail: [] Yes [x] No    Clinton Tyson Rep   10/10/23 13:52 EDT

## 2023-10-11 NOTE — TELEPHONE ENCOUNTER
Spoke with Dorothy J Zawada and gave her Dr Rashid's message. She agrees to take Belsomra 1-1/2 tablets (15 mg) each night.  She also stated that 1-1/2 Belsomra is dispendes by [facility] and it is covered by her insurance. She stated that she does not need a refill right now, and she will call when she needs it .     Please inform Lissette that I am really glad she called to let me know how its going.  It is great that she is been able to back off on the Xanax.  Stay on the half tablet and do not try to reduce further until we can get her sleeping better.  Try increasing the Belsomra (suvorexant) to 1-1/2 tablets (15 mg) each night for the next week and give us a call back and let us know how she is doing.  A lot of people have really good results with the Belsomra and we have some room left on dosage changes to make it work for her.  Thank you.

## 2023-10-12 ENCOUNTER — OFFICE VISIT (OUTPATIENT)
Dept: CARDIOLOGY | Facility: CLINIC | Age: 68
End: 2023-10-12
Payer: MEDICARE

## 2023-10-12 VITALS
HEIGHT: 69 IN | RESPIRATION RATE: 18 BRPM | BODY MASS INDEX: 27.76 KG/M2 | DIASTOLIC BLOOD PRESSURE: 76 MMHG | OXYGEN SATURATION: 99 % | SYSTOLIC BLOOD PRESSURE: 122 MMHG | HEART RATE: 78 BPM

## 2023-10-12 DIAGNOSIS — I34.0 MITRAL VALVE INSUFFICIENCY, UNSPECIFIED ETIOLOGY: ICD-10-CM

## 2023-10-12 DIAGNOSIS — E11.65 TYPE 2 DIABETES MELLITUS WITH HYPERGLYCEMIA, WITHOUT LONG-TERM CURRENT USE OF INSULIN: ICD-10-CM

## 2023-10-12 DIAGNOSIS — I48.0 PAF (PAROXYSMAL ATRIAL FIBRILLATION): Primary | ICD-10-CM

## 2023-10-12 DIAGNOSIS — E78.5 HYPERLIPIDEMIA LDL GOAL <70: ICD-10-CM

## 2023-10-12 DIAGNOSIS — I25.10 CORONARY ARTERY DISEASE INVOLVING NATIVE CORONARY ARTERY OF NATIVE HEART WITHOUT ANGINA PECTORIS: ICD-10-CM

## 2023-10-12 DIAGNOSIS — Z95.0 PRESENCE OF CARDIAC PACEMAKER: ICD-10-CM

## 2023-10-12 DIAGNOSIS — E03.9 HYPOTHYROIDISM, UNSPECIFIED TYPE: ICD-10-CM

## 2023-10-12 DIAGNOSIS — I10 PRIMARY HYPERTENSION: ICD-10-CM

## 2023-10-12 PROBLEM — E53.8 FOLATE DEFICIENCY: Status: RESOLVED | Noted: 2023-03-19 | Resolved: 2023-10-12

## 2023-10-12 PROBLEM — G47.00 INSOMNIA: Status: RESOLVED | Noted: 2023-05-08 | Resolved: 2023-10-12

## 2023-10-12 PROBLEM — K90.9 MALABSORPTION OF IRON: Status: RESOLVED | Noted: 2023-03-09 | Resolved: 2023-10-12

## 2023-10-12 PROBLEM — D64.9 ANEMIA: Status: RESOLVED | Noted: 2023-05-08 | Resolved: 2023-10-12

## 2023-10-12 LAB
AMPHETAMINES UR QL SCN: NEGATIVE NG/ML
BARBITURATES UR QL: NEGATIVE NG/ML
BENZODIAZ UR QL CFM: POSITIVE NG/ML
BZE UR QL: NEGATIVE NG/ML
CANNABINOIDS UR QL CFM: ABNORMAL
CREAT UR-MCNC: 154.7 MG/DL (ref 20–300)
ETHANOL UR-MCNC: NEGATIVE %
MEPERIDINE UR QL: NEGATIVE NG/ML
METHADONE UR QL: NEGATIVE NG/ML
OPIATES UR QL SCN: NEGATIVE NG/ML
OXYCODONE+OXYMORPHONE UR QL SCN: NEGATIVE NG/ML
PCP UR QL: NEGATIVE NG/ML
PROPOXYPH UR QL: NEGATIVE NG/ML
TRAMADOL UR-MCNC: POSITIVE UG/ML

## 2023-10-12 PROCEDURE — 3078F DIAST BP <80 MM HG: CPT

## 2023-10-12 PROCEDURE — 1160F RVW MEDS BY RX/DR IN RCRD: CPT

## 2023-10-12 PROCEDURE — 1159F MED LIST DOCD IN RCRD: CPT

## 2023-10-12 PROCEDURE — 3074F SYST BP LT 130 MM HG: CPT

## 2023-10-12 PROCEDURE — 99214 OFFICE O/P EST MOD 30 MIN: CPT

## 2023-10-12 RX ORDER — ALBUTEROL SULFATE 90 UG/1
2 AEROSOL, METERED RESPIRATORY (INHALATION) EVERY 4 HOURS PRN
COMMUNITY

## 2023-10-12 NOTE — TELEPHONE ENCOUNTER
I called Dorothy J Zawada and left a voicemail asking to call the office after a week after she increased the Belsomra (suvorexant) to 1-1/2 tablets (15 mg) and let us know how she is doing.

## 2023-10-12 NOTE — PROGRESS NOTES
[facility]  [facility] CARDIOLOGY  [address] [place] [address]  Dept: [phone]  Dept Fax: [phone]    Dorothy J Zawada  1955    Follow Up Office Visit Note    History of Present Illness:  Dorothy J Zawada is a 68 y.o. female who presents to the clinic for Follow up HTN, PAF, CAD, MR. She is doing well today, she states extremely rare feelings of palpitations without major associated complaints, otherwise she denies all cardiac complaints, no CP, SOB. Recent Echo showed normal Ef 65%, grade I, mild LAE with mild AS, moderate MAC with mild MR, elevated pulmonary pressure. There is significant variation between previous reading that showed moderate to severe MR, per this reading does not seem as severe with mild worsening pulmonary hypertension. Recently also retrieved and reviewed historical cath original stents LAD 2011 with follow up cah 2014 done in [place], LAD 50% in stent restenosis, 3rd proximal to mid Dx 40% at that time. She does appreciate having her original stents placed in late 1990's at alternative facility, do not have that report. Her BP has improved, today 122/80 on recheck, congruent with her home readings. She is currently in cast to right leg, fracture to tibia, in wheelchair, she fell at home after turning too fast, denies dizziness, presyncope. At this time continue her current plan and follow 6 months or sooner if indicated.     The following portions of the patient's history were reviewed and updated as appropriate: allergies, current medications, past family history, past medical history, past social history, past surgical history, and problem list.    Medications:  acetaminophen  albuterol sulfate HFA  ALPRAZolam  apixaban tablet  Cyanocobalamin kit  Diclofenac Sodium gel  fluticasone  folic acid  glucose blood  glucose monitor  levothyroxine  metFORMIN  metoprolol  tartrate  nitroglycerin  pantoprazole  rosuvastatin  sertraline  SITagliptin  Suvorexant tablet  Syringe misc  tiZANidine  traMADol-acetaminophen  TYLENOL PM EXTRA STRENGTH PO  vitamin D3 tablet    Subjective  Allergies   Allergen Reactions    Claritin [Loratadine] Palpitations    Celebrex [Celecoxib] Unknown - Low Severity    Fish-Derived Products GI Intolerance and Nausea And Vomiting    Zocor [Simvastatin] Myalgia    Allegra [Fexofenadine] Palpitations    Pseudoephedrine Palpitations    Wool Alcohol [Lanolin] Rash    Zithromax [Azithromycin] Rash    Zyrtec [Cetirizine] Palpitations        Past Medical History:   Diagnosis Date    Anemia     Arthritis     ASHD (arteriosclerotic heart disease)     Carcinoma     Basal Cell/Squamous Cell    Coronary artery calcification     Diabetes insipidus     Diabetes mellitus     Disease of thyroid gland     Heart attack     Heart murmur     Hiatal hernia     History of blood transfusion     Hyperlipidemia     Hypertension     Hypothyroidism     Mantoux: positive     Non Hodgkin's lymphoma     Osteopenia     Plantar fasciitis     Type 2 diabetes mellitus     Ulcer of abdomen wall     Ulcers of both great toes     Vitamin D deficiency        Past Surgical History:   Procedure Laterality Date    ANGIOPLASTY  1996    ANGIOPLASTY  2009    CARDIAC CATHETERIZATION      Stents     SECTION      DENTAL PROCEDURE      DILATATION AND CURETTAGE      ENDOSCOPY AND COLONOSCOPY  2016    FOOT SURGERY Left     GASTRIC BYPASS  2007    GASTRIC RESTRICTION SURGERY  See my chart    HERNIA REPAIR      KNEE SURGERY Left     Total Knee    LUNG REMOVAL, PARTIAL  2012    MOHS SURGERY N/A     Face and head    PACEMAKER IMPLANTATION      REPLACEMENT TOTAL KNEE Bilateral     TONSILLECTOMY         Family History   Problem Relation Age of Onset    Hyperlipidemia Mother     Heart attack Mother     Arthritis Mother     Heart disease Mother     Hypertension Mother              "Diabetes Mother             Obesity Mother     Uterine cancer Mother     Hyperlipidemia Father     Arthritis Father     Kidney disease Father     Heart attack Father     Heart disease Father     Hypertension Father             Obesity Father     Hyperlipidemia Brother     Heart disease Brother     Arthritis Brother     Diabetes Brother             Hypertension Brother             Obesity Brother     Anxiety disorder Daughter     ADD / ADHD Daughter     Breast cancer Paternal Aunt     Tuberculosis Paternal Aunt     Cancer Paternal Aunt         Breast    Diabetes Maternal Grandmother             Colon cancer Maternal Grandmother     Cancer Maternal Grandmother         Colon    Heart disease Paternal Grandfather         Social History     Socioeconomic History    Marital status: Legally     Number of children: 2   Tobacco Use    Smoking status: Former     Packs/day: 2.50     Years: 20.00     Additional pack years: 0.00     Total pack years: 50.00     Types: Cigarettes     Start date: 1969     Quit date: 1996     Years since quittin.7    Smokeless tobacco: Never   Vaping Use    Vaping Use: Never used   Substance and Sexual Activity    Alcohol use: Not Currently    Drug use: Never    Sexual activity: Not Currently       Review of Systems   All other systems reviewed and are negative.      Cardiovascular Procedures    ECHO/MUGA:   STRESS TESTS:   CARDIAC CATH:   DEVICES:   HOLTER:   CT/MRI:   VASCULAR:   CARDIOTHORACIC:     Objective  Vitals:    10/12/23 1044   BP: 122/76   BP Location: Right arm   Patient Position: Sitting   Cuff Size: Adult   Pulse: 78   Resp: 18   SpO2: 99%   Height: 175.3 cm (69\")   PainSc:   4   PainLoc: Leg     Body mass index is 27.76 kg/mý.     Physical Exam  Vitals reviewed.   Constitutional:       General: Awake.      Appearance: Normal and healthy appearance. Not in distress.   Neck:      Vascular: No JVR. JVD normal.   Pulmonary:    "   Effort: Pulmonary effort is normal.      Breath sounds: Normal breath sounds. No wheezing. No rhonchi. No rales.   Chest:      Chest wall: Not tender to palpatation.   Cardiovascular:      PMI at left midclavicular line. Normal rate. Regular rhythm. Normal S1. Normal S2.       Murmurs: There is a grade 1to 2/6 systolic murmur at the URSB. There is a grade 2/4 diastolic murmur at the LLSB.      No gallop.  No click. No rub.   Pulses:     Intact distal pulses.   Edema:     Peripheral edema absent.   Abdominal:      General: Bowel sounds are normal.      Palpations: Abdomen is soft.      Tenderness: There is no abdominal tenderness.   Musculoskeletal:         General: No tenderness. Skin:     General: Skin is warm and dry.   Neurological:      General: No focal deficit present.      Mental Status: Alert and oriented to person, place and time.   Psychiatric:         Behavior: Behavior is cooperative.          Diagnostic Data  Procedures    Advance Care Planning          Assessment and Plan  Diagnoses and all orders for this visit:    1. PAF (paroxysmal atrial fibrillation) (Primary)  On Eliquis 5 bid, metoprolol 12.5 bid, Hr today 78, rare palpitations. Denies bleeding, recent CBC normal but known historical chronic anemia with multiple iron infusions, she states to be associated with her gastric surgery. Continue plan. She did want to discuss watchman device today as she has concerns over her anticoagulant, also chronic anemia. Will sent to EP for consult.  -     Ambulatory Referral to Cardiac Electrophysiology    2. Primary hypertension  Improved, BP today 122/80, on metoprolol 12.5 bid. Continue plan.     3. Mitral valve insufficiency, unspecified etiology  Mild with moderate MAC by recent echo, historical record reveals moderate to severe, asymptomatic, will observe.     4. Coronary artery disease involving native coronary artery of native heart without angina pectoris  Multiple stents, last to LAD, cath from 2014  showed LAD 50% in stent restenosis, 3rd proximal to mid Dx 40%. Asymptomatic, she is not on ASA, bruising and historical chronic anemia, on statin, toprol. Continue plan.     5. Presence of cardiac pacemaker  Moisesmelany, interrogation August 2023, short runs SVT and NSVT seconds long. Battery life 13 yrs. Send to EP to establish care.     6. Hyperlipidemia LDL goal <70  On Crestor 20, LDL 50, trig 150 May 2023. Denies complaints. Continue plan .       Return in 6 months (on 4/12/2024) for Recheck, Sarah PINA.    Sarah Thomas, YOVANI  10/12/2023    Part of this note may be an electronic transcription/translation of spoken language to printed text using the Dragon Dictation System.

## 2023-10-13 ENCOUNTER — OFFICE VISIT (OUTPATIENT)
Dept: ENDOCRINOLOGY | Age: 68
End: 2023-10-13
Payer: MEDICARE

## 2023-10-13 VITALS
HEIGHT: 69 IN | BODY MASS INDEX: 27.85 KG/M2 | OXYGEN SATURATION: 96 % | DIASTOLIC BLOOD PRESSURE: 62 MMHG | HEART RATE: 71 BPM | SYSTOLIC BLOOD PRESSURE: 100 MMHG | WEIGHT: 188 LBS | TEMPERATURE: 96.9 F

## 2023-10-13 DIAGNOSIS — E03.9 HYPOTHYROIDISM, UNSPECIFIED TYPE: Primary | ICD-10-CM

## 2023-10-13 DIAGNOSIS — E11.65 TYPE 2 DIABETES MELLITUS WITH HYPERGLYCEMIA, WITHOUT LONG-TERM CURRENT USE OF INSULIN: ICD-10-CM

## 2023-10-13 LAB
HBA1C MFR BLD: 5.7 % (ref 4.8–5.6)
T4 FREE SERPL-MCNC: 1.75 NG/DL (ref 0.82–1.77)
TSH SERPL DL<=0.005 MIU/L-ACNC: 0.1 UIU/ML (ref 0.45–4.5)

## 2023-10-13 NOTE — PROGRESS NOTES
Chief complaint:  T2DM, hypothyroidism    HPI:   - 68 year old female here for management of diabetes mellitus type 2 and hypothyroidism  - Last seen in 6/2023  - Since her last visit she had a fall resulting in a tibia fracture  - She was diagnosed with diabetes in 1999  - No known complications to date  - Is currently taking metformin 500 mg bid and Januvia 100 mg daily  - She also has hypothyroidism following radiation therapy she had to her head and neck for treatment for her lymphoma  - She states that she believes her hypothyroidism may be related to radiation of her pituitary  - She is currently on levothyroxine 112 mcg daily  - She complains of hair loss and brittle nails    The following portions of the patient's history were reviewed and updated as appropriate: allergies, current medications, past family history, past medical history, past social history, past surgical history, and problem list.    Objective     Vitals:    10/13/23 1033   BP: 100/62   Pulse: 71   Temp: 96.9 øF (36.1 øC)   SpO2: 96%        Physical Exam  Vitals reviewed.   Constitutional:       Appearance: Normal appearance.   HENT:      Head: Normocephalic and atraumatic.   Eyes:      General: No scleral icterus.  Pulmonary:      Effort: Pulmonary effort is normal. No respiratory distress.   Neurological:      Mental Status: She is alert.   Psychiatric:         Mood and Affect: Mood normal.         Behavior: Behavior normal.         Thought Content: Thought content normal.         Judgment: Judgment normal.       Labs/Imaging:  Free T4 was normal, A1c was 5.7% yesterday    Assessment & Plan   Type 2 DM, controlled  - Cont. metformin 500 mg bid and Januvia 100 mg daily     2. Hypothyroidism  - It is unclear whether she has primary or central hypothyroidism from radiation therapy to her head and neck  - She states when her levothyroxine has been reduced in an attempt to normalize her TSH before she has had symptoms of hypothyroidism and  her TSH has not normalized so will cont. Her current dose of levothyroxine 112 mcg daily as she has no symptoms of hyperthyroidism now and her free T4 is normal     - Return to clinic in 1 year

## 2023-10-19 ENCOUNTER — TELEPHONE (OUTPATIENT)
Dept: FAMILY MEDICINE CLINIC | Facility: CLINIC | Age: 68
End: 2023-10-19
Payer: MEDICARE

## 2023-10-19 NOTE — TELEPHONE ENCOUNTER
Please tell Lissette to hang in there and we will definitely get her sleeping better.  Stop the Belsomra and I will send in a prescription for Sonata.  Please let me know where she wants that sent.  It appears to be $32 with a good Rx card at [facility].  As before, continue taking the half Xanax until we get her sleeping better with the new medication.  Hopefully this will be the last change we have to make, but there are still several more options if she still has difficulty tolerating this medication.  Thank you.

## 2023-10-19 NOTE — TELEPHONE ENCOUNTER
Spoke with Lissette.     She would like for you to send the new medication to [facility].    Also, she will need a refill on the Xanax 0.5 mg taking 1/2 pill.    Yesy

## 2023-10-19 NOTE — TELEPHONE ENCOUNTER
Caller: ZawadaLissette    Relationship: Self    Best call back number: [phone] (Home)     What medications are you currently taking:   Current Outpatient Medications on File Prior to Visit   Medication Sig Dispense Refill    acetaminophen (TYLENOL) 500 MG tablet Take 1 tablet by mouth Every 6 (Six) Hours As Needed for Mild Pain.      albuterol sulfate  (90 Base) MCG/ACT inhaler Inhale 2 puffs Every 4 (Four) Hours As Needed for Wheezing.      ALPRAZolam (Xanax) 0.5 MG tablet Take 1 tablet by mouth Daily As Needed for Anxiety. 30 tablet 2    apixaban (ELIQUIS) 5 MG tablet tablet Take 1 tablet by mouth Every 12 (Twelve) Hours. 180 tablet 3    Cholecalciferol (vitamin D3) 125 MCG (5000 UT) tablet tablet Take 1 tablet by mouth Daily.      Cyanocobalamin 1000 MCG/ML kit Inject 1,000 mcg as directed Every 30 (Thirty) Days. 3 kit 4    Diclofenac Sodium 3 % gel gel Apply  topically to the appropriate area as directed 2 (Two) Times a Day. for 60 days.      diphenhydrAMINE-APAP, sleep, (TYLENOL PM EXTRA STRENGTH PO) Take 500 mg by mouth Daily As Needed. 1 to 2 tablets as needed      fluticasone (FLONASE) 50 MCG/ACT nasal spray 2 sprays into the nostril(s) as directed by provider As Needed.      folic acid (FOLVITE) 1 MG tablet Take 1 tablet by mouth Daily. 90 tablet 3    glucose blood test strip E11.9, test once per day 90 each 1    glucose monitor monitoring kit 1 each Daily. Dispense glucometer with brand based off insurance coverage 1 each 0    levothyroxine (Synthroid) 112 MCG tablet Take 1 tablet by mouth Daily. 30 tablet 11    metFORMIN (GLUCOPHAGE) 500 MG tablet Take 1 tablet by mouth 2 (Two) Times a Day With Meals. 180 tablet 3    metoprolol tartrate (LOPRESSOR) 25 MG tablet Take 0.5 tablets by mouth 2 (Two) Times a Day. 90 tablet 3    nitroglycerin (NITROSTAT) 0.4 MG SL tablet Place 1 tablet under the tongue Every 5 (Five) Minutes As Needed for Chest Pain. Take no more than 3 doses in 15 minutes. 25  "tablet 2    pantoprazole (Protonix) 40 MG EC tablet Take 1 tablet by mouth Daily. 90 tablet 1    rosuvastatin (CRESTOR) 20 MG tablet Take 1 tablet by mouth Daily. 90 tablet 3    sertraline (ZOLOFT) 100 MG tablet Take 1 tablet by mouth Daily. (Patient taking differently: Take 0.5 tablets by mouth Daily.) 90 tablet 3    SITagliptin (Januvia) 100 MG tablet By mouth take one tab daily. 90 tablet 3    Suvorexant 10 MG tablet Take 15 mg by mouth every night at bedtime. 45 tablet 1    Syringe 23G X 1\" 3 ML misc Inject 1 syringe under the skin into the appropriate area as directed Every 30 (Thirty) Days. 50 each 1    tiZANidine (ZANAFLEX) 2 MG tablet TAKE 1 TABLET BY MOUTH EVERY 8 HOURS AS NEEDED FOR MUSCLE SPASMS 90 tablet 1    traMADol-acetaminophen (ULTRACET) 37.5-325 MG per tablet Take 1 tablet by mouth Every 6 (Six) Hours As Needed for Moderate Pain. Take 1 tablet by mouth At Night As Needed for Moderate Pain 30 tablet 1     No current facility-administered medications on file prior to visit.          When did you start taking these medications: 10/16/23    Which medication are you concerned about:   Suvorexant 10 MG tablet [[id number]] (Order [id number])     Who prescribed you this medication: AURELIA    What are your concerns: DOESN'T HELP HER SLEEP, GIVES HER NIGHT OLIVO     How long have you had these concerns: ONCE UPPING THE DOSE.     "

## 2023-10-19 NOTE — TELEPHONE ENCOUNTER
Lissette has concerns about her Suvorexant 10 MG tablet.       What are your concerns: DOESN'T HELP HER SLEEP, GIVES HER NIGHT OLIVO      How long have you had these concerns: ONCE UPPING THE DOSE.      Please advise,    Yesy

## 2023-11-02 DIAGNOSIS — E11.9 TYPE 2 DIABETES MELLITUS WITHOUT COMPLICATION, WITHOUT LONG-TERM CURRENT USE OF INSULIN: ICD-10-CM

## 2023-11-02 DIAGNOSIS — I10 PRIMARY HYPERTENSION: ICD-10-CM

## 2023-11-02 DIAGNOSIS — I25.119 CORONARY ARTERY DISEASE INVOLVING NATIVE HEART WITH ANGINA PECTORIS, UNSPECIFIED VESSEL OR LESION TYPE: ICD-10-CM

## 2023-11-02 NOTE — TELEPHONE ENCOUNTER
Caller: ZawadaLissette    Relationship: Self    Best call back number: [phone]     Requested Prescriptions:   Requested Prescriptions     Pending Prescriptions Disp Refills    apixaban (ELIQUIS) 5 MG tablet tablet 180 tablet 3     Sig: Take 1 tablet by mouth Every 12 (Twelve) Hours.    sertraline (ZOLOFT) 100 MG tablet 90 tablet 3     Sig: Take 1 tablet by mouth Daily.    metoprolol tartrate (LOPRESSOR) 25 MG tablet 90 tablet 3     Sig: Take 0.5 tablets by mouth 2 (Two) Times a Day.        Pharmacy where request should be sent: [facility] [address] [phone] [facility] [phone]      Last office visit with prescribing clinician: 9/28/2023   Last telemedicine visit with prescribing clinician: Visit date not found   Next office visit with prescribing clinician: 11/8/2023     Does the patient have less than a 3 day supply:  [] Yes  [x] No    Would you like a call back once the refill request has been completed: [] Yes [x] No    Clinton Tim Rep   11/02/23 09:59 EDT

## 2023-11-05 RX ORDER — SERTRALINE HYDROCHLORIDE 100 MG/1
100 TABLET, FILM COATED ORAL DAILY
Qty: 90 TABLET | Refills: 3 | OUTPATIENT
Start: 2023-11-05

## 2023-11-08 ENCOUNTER — OFFICE VISIT (OUTPATIENT)
Dept: FAMILY MEDICINE CLINIC | Facility: CLINIC | Age: 68
End: 2023-11-08
Payer: MEDICARE

## 2023-11-08 VITALS
HEART RATE: 80 BPM | TEMPERATURE: 98.2 F | DIASTOLIC BLOOD PRESSURE: 78 MMHG | OXYGEN SATURATION: 98 % | BODY MASS INDEX: 28.14 KG/M2 | HEIGHT: 69 IN | WEIGHT: 190 LBS | SYSTOLIC BLOOD PRESSURE: 148 MMHG

## 2023-11-08 DIAGNOSIS — R39.11 URINARY HESITANCY: ICD-10-CM

## 2023-11-08 DIAGNOSIS — J01.00 ACUTE NON-RECURRENT MAXILLARY SINUSITIS: Primary | ICD-10-CM

## 2023-11-08 DIAGNOSIS — R25.2 BILATERAL LEG CRAMPS: ICD-10-CM

## 2023-11-08 DIAGNOSIS — M79.604 PAIN OF RIGHT LOWER EXTREMITY: ICD-10-CM

## 2023-11-08 DIAGNOSIS — F41.9 ANXIETY: ICD-10-CM

## 2023-11-08 DIAGNOSIS — G47.00 INSOMNIA, UNSPECIFIED TYPE: ICD-10-CM

## 2023-11-08 DIAGNOSIS — J02.9 SORE THROAT: ICD-10-CM

## 2023-11-08 DIAGNOSIS — R68.83 CHILLS: ICD-10-CM

## 2023-11-08 LAB
EXPIRATION DATE: NORMAL
FLUAV AG UPPER RESP QL IA.RAPID: NOT DETECTED
FLUBV AG UPPER RESP QL IA.RAPID: NOT DETECTED
INTERNAL CONTROL: NORMAL
Lab: NORMAL
SARS-COV-2 AG UPPER RESP QL IA.RAPID: NOT DETECTED

## 2023-11-08 PROCEDURE — 1159F MED LIST DOCD IN RCRD: CPT | Performed by: STUDENT IN AN ORGANIZED HEALTH CARE EDUCATION/TRAINING PROGRAM

## 2023-11-08 PROCEDURE — 87428 SARSCOV & INF VIR A&B AG IA: CPT | Performed by: STUDENT IN AN ORGANIZED HEALTH CARE EDUCATION/TRAINING PROGRAM

## 2023-11-08 PROCEDURE — 1160F RVW MEDS BY RX/DR IN RCRD: CPT | Performed by: STUDENT IN AN ORGANIZED HEALTH CARE EDUCATION/TRAINING PROGRAM

## 2023-11-08 PROCEDURE — 3078F DIAST BP <80 MM HG: CPT | Performed by: STUDENT IN AN ORGANIZED HEALTH CARE EDUCATION/TRAINING PROGRAM

## 2023-11-08 PROCEDURE — 99214 OFFICE O/P EST MOD 30 MIN: CPT | Performed by: STUDENT IN AN ORGANIZED HEALTH CARE EDUCATION/TRAINING PROGRAM

## 2023-11-08 PROCEDURE — 3044F HG A1C LEVEL LT 7.0%: CPT | Performed by: STUDENT IN AN ORGANIZED HEALTH CARE EDUCATION/TRAINING PROGRAM

## 2023-11-08 PROCEDURE — 3077F SYST BP >= 140 MM HG: CPT | Performed by: STUDENT IN AN ORGANIZED HEALTH CARE EDUCATION/TRAINING PROGRAM

## 2023-11-08 RX ORDER — MOMETASONE FUROATE 50 UG/1
2 SPRAY, METERED NASAL DAILY
COMMUNITY

## 2023-11-08 RX ORDER — AMOXICILLIN 500 MG/1
1000 CAPSULE ORAL 3 TIMES DAILY
Qty: 60 CAPSULE | Refills: 0 | Status: SHIPPED | OUTPATIENT
Start: 2023-11-08

## 2023-11-08 RX ORDER — ZALEPLON 10 MG/1
20 CAPSULE ORAL NIGHTLY
Qty: 30 CAPSULE | Refills: 2 | Status: SHIPPED | OUTPATIENT
Start: 2023-11-08

## 2023-11-08 NOTE — PROGRESS NOTES
Chief Complaint  Chills (Had chills since Friday), Sore Throat, Facial Pain, Cough, urinary hesitancy (Pt states she has limited urinary output over a month), Fatigue, Insomnia (Leg apin), and right leg pain    Subjective        Dorothy J Zawada presents to [facility]  History of Present Illness  Lissette is an established patient presenting for follow-up for insomnia and anxiety, as well as management of pain associated with recent fractured foot.  Today, she is having acute issues with cough, sore throat, facial pain, fatigue, chills, reduced urine output.  Also insomnia and right leg pain.  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 Family history of breast cancer in aunt, and grandmother with colon cancer. She is a former smoker.  She follows with multiple specialists including endocrinology, cardiology, hematology, orthopedic surgery.  Will be seeing electrophysiologist in December to discuss Watchman.    Sinusitis/chills/sore throat: Symptoms started 5 days ago.  Sister-in-law had flu-a and she was exposed.  Initially primarily with chills, then developed cough, nasal congestion, right-sided sinus pressure, right ear pressure.  Takes Nasonex and Tylenol PM, has also been taking NyQuil.  Is eating and drinking.    Urinary hesitancy: Reports that she has been drinking more for at least the past month, but has been urinating less than usual.  Also has urinary hesitancy.  Is not having to get up at night to urinate.  Has been drinking more cranberry juice.  Is concerned that she may have a UTI due to her change in urinary pattern.    Insomnia/anxiety: Sonata, 10 mg was only giving her 3 hours of sleep, then she would have to get up to take her Tylenol PM to go back to sleep.  Belsomra gave her bad dreams.  She  "slept best when taking the 1 mg Xanax at night.  She continues to take 0.5 mg Xanax nightly.    Right leg pain/bilateral leg cramps: Now out of the cast for her right leg.  Has been at PT 3 times and is walking today without the assistance of a walker/cane/crutch.  Still having pain and reports getting adequate pain relief with current tramadol once or twice daily.  Also reports leg cramps are managed with Zanaflex.  Wearing compression stocking to help with swelling, but still having significant swelling.    Objective   Vital Signs:  /78   Pulse 80   Temp 98.2 °F (36.8 °C)   Ht 175.3 cm (69\")   Wt 86.2 kg (190 lb)   SpO2 98%   BMI 28.06 kg/m²   Estimated body mass index is 28.06 kg/m² as calculated from the following:    Height as of this encounter: 175.3 cm (69\").    Weight as of this encounter: 86.2 kg (190 lb).               Physical Exam  Vitals and nursing note reviewed.   Constitutional:       General: She is awake. She is not in acute distress.     Appearance: Normal appearance.   HENT:      Head: Normocephalic and atraumatic.      Salivary Glands: Right salivary gland is not diffusely enlarged or tender. Left salivary gland is not diffusely enlarged or tender.      Right Ear: Tympanic membrane, ear canal and external ear normal. There is no impacted cerumen.      Left Ear: Tympanic membrane, ear canal and external ear normal. There is no impacted cerumen.      Nose: Congestion present. No rhinorrhea.      Right Nostril: Occlusion present.      Right Sinus: Maxillary sinus tenderness and frontal sinus tenderness present.      Left Sinus: No maxillary sinus tenderness or frontal sinus tenderness.      Mouth/Throat:      Mouth: Mucous membranes are moist.      Pharynx: Oropharynx is clear. No oropharyngeal exudate or posterior oropharyngeal erythema.      Tonsils: No tonsillar exudate.   Eyes:      Extraocular Movements: Extraocular movements intact.   Neck:      Thyroid: No thyroid mass, " thyromegaly or thyroid tenderness.   Cardiovascular:      Rate and Rhythm: Normal rate and regular rhythm.      Heart sounds: No murmur heard.     No friction rub. No gallop.   Pulmonary:      Effort: Pulmonary effort is normal.      Breath sounds: Normal breath sounds. No wheezing, rhonchi or rales.   Abdominal:      General: Bowel sounds are normal. There is no distension.      Palpations: Abdomen is soft.      Tenderness: There is abdominal tenderness (LUQ TTP). There is no right CVA tenderness, left CVA tenderness or guarding.   Musculoskeletal:      Cervical back: Normal range of motion and neck supple. No rigidity or tenderness.      Right lower leg: Edema (in compression sock) present.      Left lower leg: No edema.   Lymphadenopathy:      Cervical: No cervical adenopathy.   Skin:     General: Skin is warm and dry.      Capillary Refill: Capillary refill takes less than 2 seconds.   Neurological:      General: No focal deficit present.      Mental Status: She is alert.   Psychiatric:         Mood and Affect: Mood normal.         Behavior: Behavior normal. Behavior is cooperative.        Result Review :                   Assessment and Plan   Diagnoses and all orders for this visit:    1. Acute non-recurrent maxillary sinusitis (Primary)  -     amoxicillin (AMOXIL) 500 MG capsule; Take 2 capsules by mouth 3 (Three) Times a Day.  Dispense: 60 capsule; Refill: 0    2. Chills  -     POCT SARS-CoV-2 Antigen RAULITO + Flu    3. Sore throat  -     POCT SARS-CoV-2 Antigen RAULITO + Flu    4. Urinary hesitancy    5. Insomnia, unspecified type  -     zaleplon (SONATA) 10 MG capsule; Take 2 capsules by mouth Every Night.  Dispense: 30 capsule; Refill: 2    6. Anxiety    7. Pain of right lower extremity    8. Bilateral leg cramps    Sinusitis/chills/sore throat: History, symptoms, exam consistent with sinusitis.  Patient reports amoxicillin well-tolerated in the past.  We will treat with amoxicillin.  Encouraged fluids,  supportive care otherwise.  Return precautions given.  Additional information in AVS.    Urinary hesitancy: Patient unable to provide urine sample today.  History and symptoms are more consistent with reduced fluid intake more so than UTI concern.  Patient will try to continue to drink additional fluids and will return to provide urine sample if she has further concern.    Insomnia/anxiety: Patient agreeable to trying Sonata, 20 mg.  Continuing to work toward goal of eliminating Xanax.  We will consider Ambien if this dosage of Sonata does not work and then try Ativan if necessary.  Patient is doing a great job trying to work with us to eliminate her high risk medications from her med list.    Right leg pain/bilateral leg cramps: Congratulated her on her progress with physical therapy.  For the time being, we will continue tramadol and Zanaflex since these are working well for her.  She reports follow-up with orthopedist in about 2 weeks.      Preventative:    PAP: History of positive Pap    Mammogram: 6/2/2023, will repeat in 2 years    Colonoscopy: 5/18/2016    Dexa: 6/2/2023    Vaccinations: Reports pneumococcal from Jayden Nova MD, [place], [place] ~2021, records requested    Eye: 2/17/2023    Dental: Deferred    Foot exams: 6/28/2023    Home: Lives at home with daughter, sister-in-law, niece, granddaughter, feels safe.    Work: Deferred    Social: Deferred  Exercise: None currently    Diet: Deferred       Follow Up   Return in about 2 months (around 1/23/2024) for Insomnia, anxiety, urinary hesitancy.  Patient was given instructions and counseling regarding her condition or for health maintenance advice. Please see specific information pulled into the AVS if appropriate.

## 2023-11-08 NOTE — PATIENT INSTRUCTIONS
Symptoms and exam are consistent with sinus infection.   You tested negative for flu and COVID today.  You can alternate Tylenol for fever and body aches.  Make sure you drink plenty of fluids.  Saline nasal spray can also help with sinus congestion.  Tea with honey is also good for cough and sore throat.    If you develop fevers that are not controlled with ibuprofen and Tylenol, develop severe shortness of breath, feel like your throat is closing off, you should follow-up with the ER, urgent care, or with this clinic right away.    If we can be of further assistance prior to next clinic visit, feel free to give us a call.

## 2023-11-17 DIAGNOSIS — G47.00 INSOMNIA, UNSPECIFIED TYPE: ICD-10-CM

## 2023-11-17 DIAGNOSIS — F41.9 ANXIETY: ICD-10-CM

## 2023-11-17 RX ORDER — ALPRAZOLAM 0.5 MG/1
0.25 TABLET ORAL DAILY PRN
Qty: 15 TABLET | Refills: 2 | OUTPATIENT
Start: 2023-11-17

## 2023-11-17 RX ORDER — SERTRALINE HYDROCHLORIDE 100 MG/1
100 TABLET, FILM COATED ORAL DAILY
Qty: 90 TABLET | Refills: 3 | OUTPATIENT
Start: 2023-11-17

## 2023-11-17 NOTE — TELEPHONE ENCOUNTER
PT CALLED OFFICE BACK BECAUSE SHE HAD MISSED 2 CALS MORE DR. MOSES. HE WAS IN WITH A PT AND TOLD ME TO TELL HER HE WOULD CALL HER BACK WHEN HE GETS A CHANCE.

## 2023-11-17 NOTE — TELEPHONE ENCOUNTER
Caller: Zawada Lissette SHAHEED    Relationship: Self    Best call back number: [phone]    Requested Prescriptions:   Requested Prescriptions     Pending Prescriptions Disp Refills    ALPRAZolam (Xanax) 0.5 MG tablet 15 tablet 2     Sig: Take 0.5 tablets by mouth Daily As Needed for Anxiety.    sertraline (ZOLOFT) 100 MG tablet 90 tablet 3     Sig: Take 1 tablet by mouth Daily.        Pharmacy where request should be sent: [id number] - [address] AT [facility] [address] [place] [address] - [phone]  - [phone] [facility]     Last office visit with prescribing clinician: 11/8/2023   Last telemedicine visit with prescribing clinician: Visit date not found   Next office visit with prescribing clinician: 1/23/2024     Additional details provided by patient: 3 DAYS SUPPLY. ALSO, THE zaleplon (SONATA) 10 MG capsule IS NOT WORKING.  SHE IS STILL NOT SLEEPING.     Does the patient have less than a 3 day supply:  [x] Yes  [] No    Would you like a call back once the refill request has been completed: [] Yes [x] No    If the office needs to give you a call back, can they leave a voicemail: [] Yes [x] No    Clinton Arndt Rep   11/17/23 09:47 EST

## 2023-12-04 DIAGNOSIS — E11.9 TYPE 2 DIABETES MELLITUS WITHOUT COMPLICATION, WITHOUT LONG-TERM CURRENT USE OF INSULIN: ICD-10-CM

## 2023-12-04 NOTE — TELEPHONE ENCOUNTER
Caller: Zawada, Dorothy J    Relationship: Self    Best call back number: [phone]    Requested Prescriptions:   Requested Prescriptions     Pending Prescriptions Disp Refills    metFORMIN (GLUCOPHAGE) 500 MG tablet 180 tablet 3     Sig: Take 1 tablet by mouth 2 (Two) Times a Day With Meals.        Pharmacy where request should be sent: [facility] [address] [phone] [facility] [phone]      Last office visit with prescribing clinician: 11/8/2023   Last telemedicine visit with prescribing clinician: Visit date not found   Next office visit with prescribing clinician: 1/23/2024     Additional details provided by patient: NO    Does the patient have less than a 3 day supply:  [] Yes  [x] No    Would you like a call back once the refill request has been completed: [] Yes [x] No    If the office needs to give you a call back, can they leave a voicemail: [] Yes [] No    Clinton Blount Rep   12/04/23 15:37 EST

## 2023-12-05 DIAGNOSIS — M79.10 MUSCLE ACHE: ICD-10-CM

## 2023-12-05 DIAGNOSIS — R25.2 BILATERAL LEG CRAMPS: ICD-10-CM

## 2023-12-05 DIAGNOSIS — R53.83 FATIGUE, UNSPECIFIED TYPE: ICD-10-CM

## 2023-12-05 RX ORDER — TIZANIDINE 2 MG/1
2 TABLET ORAL EVERY 8 HOURS PRN
Qty: 90 TABLET | Refills: 1 | Status: SHIPPED | OUTPATIENT
Start: 2023-12-05

## 2023-12-13 DIAGNOSIS — G47.00 INSOMNIA, UNSPECIFIED TYPE: ICD-10-CM

## 2023-12-13 DIAGNOSIS — E11.9 TYPE 2 DIABETES MELLITUS WITHOUT COMPLICATION, WITHOUT LONG-TERM CURRENT USE OF INSULIN: ICD-10-CM

## 2023-12-13 RX ORDER — ZOLPIDEM TARTRATE 6.25 MG/1
6.25 TABLET, FILM COATED, EXTENDED RELEASE ORAL NIGHTLY PRN
Qty: 30 TABLET | Refills: 0 | Status: SHIPPED | OUTPATIENT
Start: 2023-12-13

## 2023-12-13 NOTE — TELEPHONE ENCOUNTER
Spoke with Dorothy J Zawada  She stated that she needs a refill of Metformin and Januvia. That her medication bottle does not show any refills pending. She needs this refill to be sent to [facility] because she does not have enough medication to wait for [facility] to deliver to her. It usually takes a week.     LOV 11/08/2023    Please advise

## 2023-12-13 NOTE — TELEPHONE ENCOUNTER
KIARA  In your message you said Januvia and Metformin  refill sent and forgot to mention Zolpidem.  EPIC shows zolpidem CR (AMBIEN CR) 6.25 MG was refilled as well.   Called patient, no answer. LVM stating that Dr Rashid sent her refills to [facility] pharmacy.

## 2023-12-13 NOTE — PROGRESS NOTES
Cardiac Electrophysiology Outpatient Note  [facility] at [facility]    Office Visit     Dorothy J Zawada  [id number]  12/14/2023    Primary Care Physician: Lenin Rashid MD    Referred By: Sarah Thomas*    Subjective     CC: Atrial fibrillation    Problem List:  Paroxysmal atrial fibrillation  Coronary artery disease  Single-vessel disease with 4 stents in place per patient, while in [facility] 10/2014 in [place]: Previously placed stents in proximal to mid LAD are patent with mild to moderate ISR with acceptable FFR no intervention  SSS s/p MDT DC PPM  Hypertension  VHD  TTE 7/23: LVEF 65%, grade 1 LV diastolic dysfunction, mild AS, mild MR, RVSP 58  Anemia  7/23 H&H 13/39  Hypertension  Dyslipidemia  Prediabetes      History of Present Illness:   Dorothy J Zawada is a 68 y.o. female who presents to my electrophysiology clinic as a referral from Sraah PINA to establish management of of Medtronic dual-chamber pacemaker and discussed Watchman left atrial occlusive device in the setting of chronic anemia.     She has a history of atrial fibrillation but is uncertain how often she is having it.  Sometimes she will feel palpitations.  There are times she thinks she was in atrial fibrillation when device is actually showing normal sinus rhythm.  She has had chronic anemia but no obvious bleeding.  Her biggest issue is she has significant arthritis and has been recommended not to take NSAIDs along with her Eliquis.  Therefore she is interested in trying to get off the Eliquis if possible.    Past Medical History:   Diagnosis Date    Anemia     Arthritis     ASHD (arteriosclerotic heart disease)     Carcinoma     Basal Cell/Squamous Cell    Coronary artery calcification     Diabetes insipidus 1996    Diabetes mellitus     Disease of thyroid gland     Heart attack     Heart murmur     Hiatal hernia     History of blood transfusion     Hyperlipidemia     Hypertension      Hypothyroidism     Mantoux: positive     Non Hodgkin's lymphoma     Osteopenia     Plantar fasciitis     Type 2 diabetes mellitus 1999    Ulcer of abdomen wall     Ulcers of both great toes     Vitamin D deficiency 1996       Past Surgical History:   Procedure Laterality Date    ANGIOPLASTY  1996    ANGIOPLASTY  2009    CARDIAC CATHETERIZATION      Stents     SECTION      DENTAL PROCEDURE      DILATATION AND CURETTAGE      ENDOSCOPY AND COLONOSCOPY  2016    FOOT SURGERY Left     GASTRIC BYPASS  2007    GASTRIC RESTRICTION SURGERY  See my chart    HERNIA REPAIR      KNEE SURGERY Left     Total Knee    LUNG REMOVAL, PARTIAL  2012    MOHS SURGERY N/A     Face and head    PACEMAKER IMPLANTATION      REPLACEMENT TOTAL KNEE Bilateral     TONSILLECTOMY         Family History   Problem Relation Age of Onset    Hyperlipidemia Mother     Heart attack Mother     Arthritis Mother     Heart disease Mother     Hypertension Mother             Diabetes Mother             Obesity Mother     Uterine cancer Mother     Hyperlipidemia Father     Arthritis Father     Kidney disease Father     Heart attack Father     Heart disease Father     Hypertension Father             Obesity Father     Hyperlipidemia Brother     Heart disease Brother     Arthritis Brother     Diabetes Brother             Hypertension Brother             Obesity Brother     Anxiety disorder Daughter     ADD / ADHD Daughter     Breast cancer Paternal Aunt     Tuberculosis Paternal Aunt     Cancer Paternal Aunt         Breast    Diabetes Maternal Grandmother             Colon cancer Maternal Grandmother     Cancer Maternal Grandmother         Colon    Heart disease Paternal Grandfather        Social History     Socioeconomic History    Marital status: Legally     Number of children: 2   Tobacco Use    Smoking status: Former     Packs/day: 2.50     Years: 20.00     Additional pack years: 0.00      Total pack years: 50.00     Types: Cigarettes     Start date: 1969     Quit date: 1996     Years since quittin.9    Smokeless tobacco: Never   Vaping Use    Vaping Use: Never used   Substance and Sexual Activity    Alcohol use: Not Currently    Drug use: Never    Sexual activity: Not Currently         Current Outpatient Medications:     acetaminophen (TYLENOL) 500 MG tablet, Take 1 tablet by mouth Every 6 (Six) Hours As Needed for Mild Pain., Disp: , Rfl:     albuterol sulfate  (90 Base) MCG/ACT inhaler, Inhale 2 puffs Every 4 (Four) Hours As Needed for Wheezing., Disp: , Rfl:     ALPRAZolam (Xanax) 0.5 MG tablet, Take 0.5 tablets by mouth Daily As Needed for Anxiety., Disp: 15 tablet, Rfl: 2    Cholecalciferol (vitamin D3) 125 MCG (5000 UT) tablet tablet, Take 1 tablet by mouth Daily., Disp: , Rfl:     Cyanocobalamin 1000 MCG/ML kit, Inject 1,000 mcg as directed Every 30 (Thirty) Days., Disp: 3 kit, Rfl: 4    Diclofenac Sodium 3 % gel gel, Apply  topically to the appropriate area as directed 2 (Two) Times a Day. for 60 days., Disp: , Rfl:     diphenhydrAMINE-APAP, sleep, (TYLENOL PM EXTRA STRENGTH PO), Take 500 mg by mouth Daily As Needed. 1 to 2 tablets as needed, Disp: , Rfl:     fluticasone (FLONASE) 50 MCG/ACT nasal spray, 2 sprays into the nostril(s) as directed by provider As Needed., Disp: , Rfl:     folic acid (FOLVITE) 1 MG tablet, Take 1 tablet by mouth Daily., Disp: 90 tablet, Rfl: 3    glucose blood test strip, E11.9, test once per day, Disp: 90 each, Rfl: 1    glucose monitor monitoring kit, 1 each Daily. Dispense glucometer with brand based off insurance coverage, Disp: 1 each, Rfl: 0    levothyroxine (Synthroid) 112 MCG tablet, Take 1 tablet by mouth Daily., Disp: 30 tablet, Rfl: 11    metFORMIN (GLUCOPHAGE) 500 MG tablet, Take 1 tablet by mouth 2 (Two) Times a Day With Meals., Disp: 180 tablet, Rfl: 3    metoprolol tartrate (LOPRESSOR) 25 MG tablet, Take 0.5 tablets by mouth 2  "(Two) Times a Day., Disp: 90 tablet, Rfl: 3    mometasone (NASONEX) 50 MCG/ACT nasal spray, 2 sprays into the nostril(s) as directed by provider Daily., Disp: , Rfl:     nitroglycerin (NITROSTAT) 0.4 MG SL tablet, Place 1 tablet under the tongue Every 5 (Five) Minutes As Needed for Chest Pain. Take no more than 3 doses in 15 minutes., Disp: 25 tablet, Rfl: 2    pantoprazole (Protonix) 40 MG EC tablet, Take 1 tablet by mouth Daily., Disp: 90 tablet, Rfl: 1    rosuvastatin (CRESTOR) 20 MG tablet, Take 1 tablet by mouth Daily., Disp: 90 tablet, Rfl: 3    sertraline (Zoloft) 50 MG tablet, Take 1 tablet by mouth Daily., Disp: 90 tablet, Rfl: 3    SITagliptin (Januvia) 100 MG tablet, By mouth take one tab daily., Disp: 90 tablet, Rfl: 3    Syringe 23G X 1\" 3 ML misc, Inject 1 syringe under the skin into the appropriate area as directed Every 30 (Thirty) Days., Disp: 50 each, Rfl: 1    tiZANidine (ZANAFLEX) 2 MG tablet, Take 1 tablet by mouth Every 8 (Eight) Hours As Needed for Muscle Spasms., Disp: 90 tablet, Rfl: 1    traMADol-acetaminophen (ULTRACET) 37.5-325 MG per tablet, Take 1 tablet by mouth Every 6 (Six) Hours As Needed for Moderate Pain. Take 1 tablet by mouth At Night As Needed for Moderate Pain, Disp: 30 tablet, Rfl: 1    zolpidem CR (AMBIEN CR) 6.25 MG CR tablet, Take 1 tablet by mouth At Night As Needed for Sleep., Disp: 30 tablet, Rfl: 0    aspirin 81 MG EC tablet, Take 1 tablet by mouth Daily., Disp: , Rfl:     Allergies:   Allergies   Allergen Reactions    Claritin [Loratadine] Palpitations    Celebrex [Celecoxib] Unknown - Low Severity    Fish-Derived Products GI Intolerance and Nausea And Vomiting    Zocor [Simvastatin] Myalgia    Allegra [Fexofenadine] Palpitations    Pseudoephedrine Palpitations    Wool Alcohol [Lanolin] Rash    Zithromax [Azithromycin] Rash    Zyrtec [Cetirizine] Palpitations       Objective   Vital Signs: Blood pressure 124/68, pulse 63, height 175.3 cm (69\"), weight 84.3 kg (185 lb " "12.8 oz), SpO2 97%, not currently breastfeeding.    PHYSICAL EXAM  General appearance: Awake, alert, cooperative  Head: Normocephalic, without obvious abnormality, atraumatic  Neck: No JVD  Lungs: Clear to ascultation bilaterally  Heart: Regular rate and rhythm, no murmurs, 2+ LE pulses, no lower extremity swelling  Skin: Skin color, turgor normal, no rashes or lesions  Neurologic: Grossly normal     Lab Results   Component Value Date    GLUCOSE 122 (H) 07/13/2023    CALCIUM 9.5 07/13/2023     07/13/2023    K 4.2 07/13/2023    CO2 25.5 07/13/2023     07/13/2023    BUN 13 07/13/2023    CREATININE 0.90 07/13/2023    BCR 14.4 07/13/2023    ANIONGAP 10.5 07/13/2023     Lab Results   Component Value Date    WBC 6.92 07/13/2023    HGB 13.0 07/13/2023    HCT 39.0 07/13/2023    MCV 95.8 07/13/2023     07/13/2023     No results found for: \"INR\", \"PROTIME\"  Lab Results   Component Value Date    TSH 0.097 (L) 10/12/2023          Results for orders placed in visit on 07/24/23    Adult Transthoracic Echo Complete W/ Cont if Necessary Per Protocol    Interpretation Summary    Left ventricular systolic function is normal. Calculated left ventricular EF = 65% Left ventricular ejection fraction appears to be 61 - 65%.    Left ventricular diastolic function is consistent with (grade I) impaired relaxation.    The left atrial cavity is mildly dilated.    Mild aortic valve stenosis is present. Aortic valve area is 2 cm2.Moderate thickening and calcification of the aortic valve    Peak velocity of the flow distal to the aortic valve is 230 cm/s. Aortic valve maximum pressure gradient is 20 mmHg. Aortic valve mean pressure gradient is 11 mmHg.    The mitral valve peak gradient is 6 mmHg. The mitral valve mean gradient is 2 mmHg. The mitral valve area (PHT) is 3.2 cm2. Moderate MAC- Mild MR    Estimated right ventricular systolic pressure from tricuspid regurgitation is markedly elevated (>55 mmHg). Calculated right " ventricular systolic pressure from tricuspid regurgitation is 58 mmHg.    The aortic root measures 2.8 cm.    Normal Rv size and function    No pericardial effusion         I personally viewed and interpreted the patient's EKG/Telemetry/lab data      ECG 12 Lead    Date/Time: 12/14/2023 3:19 PM  Performed by: Niko Page MD    Authorized by: Niko Page MD  Comparison: compared with previous ECG from 1/13/2023  Similar to previous ECG  Comments: Atrial paced rhythm with intact AV conduction          Dorothy J Zawada  reports that she quit smoking about 27 years ago. Her smoking use included cigarettes. She started smoking about 54 years ago. She has a 50.00 pack-year smoking history. She has never used smokeless tobacco..     Advance Care Planning   Advance Care Planning: ACP discussion was held with the patient during this visit. Patient does not have an advance directive, information provided.     Assessment & Plan    1. PAF (paroxysmal atrial fibrillation)  Patient's history paroxysmal atrial fibrillation that is overall well-controlled.  Not had any recent recurrence.  She remains on Eliquis for anticoagulation.  Her biggest concern is with inability to take NSAIDs while she is on Eliquis.  She has significant arthritis and this has been a big problem for her.  She also does have chronic anemia without any known source of bleeding.  This is thought to be related to her prior gastric bypass.  She was referred for consideration of Watchman device.    We discussed the role of anticoagulation in atrial fibrillation as well as the role for potential Watchman device.  Interestingly, she has not had any significant atrial fibrillation on her pacemaker since it was implanted.  We discussed different options going forward: (1) continue on Eliquis with moderated use of NSAIDs, (2) stopping Eliquis and monitoring for recurrence of atrial fibrillation via her pacemaker, (3) proceeding with Watchman device.  For the  latter we discussed how the procedures performed and the pros and cons of this.  Since she has a relatively low atrial fibrillation burden, and there are potential complications associated with the Watchman device including device related thrombosis I would not favor this at the current time.  After our discussion, we elected to stop the Eliquis and go back to taking a baby aspirin.  We will monitor for recurrence of atrial fibrillation via her device.  If she does have significant recurrences of atrial fibrillation we can discuss either a pill in the pocket anticoagulation strategy versus considering Watchman device.    2. Presence of cardiac pacemaker  Her Medtronic dual-chamber pacemaker was interrogated is functioning well.  She has approximately 13 years of battery life remaining.  No significant atrial fibrillation.  No changes are made to her pacemaker settings today.       Follow Up:  Return in about 6 months (around 6/14/2024) for MDT.      Thank you for allowing me to participate in the care of your patient. Please do not hesitate to contact me with additional questions or concerns.      Niko Page M.D.  Cardiac Electrophysiologist  [facility] / [facility]

## 2023-12-13 NOTE — TELEPHONE ENCOUNTER
Caller: Zawada, Dorothy J    Relationship: Self    Best call back number: [phone]    Requested Prescriptions:   Requested Prescriptions     Pending Prescriptions Disp Refills    zolpidem CR (AMBIEN CR) 6.25 MG CR tablet 30 tablet 2     Sig: Take 1 tablet by mouth At Night As Needed for Sleep.        Pharmacy where request should be sent: [id number] - [address] - [phone]  - [phone] FX     Last office visit with prescribing clinician: 11/8/2023   Last telemedicine visit with prescribing clinician: Visit date not found   Next office visit with prescribing clinician: 1/23/2024     Additional details provided by patient:     Does the patient have less than a 3 day supply:  [x] Yes  [] No    Would you like a call back once the refill request has been completed: [x] Yes [] No    If the office needs to give you a call back, can they leave a voicemail: [x] Yes [] No    Clinton Williamson Rep   12/13/23 09:44 EST

## 2023-12-14 ENCOUNTER — OFFICE VISIT (OUTPATIENT)
Dept: CARDIOLOGY | Facility: CLINIC | Age: 68
End: 2023-12-14
Payer: MEDICARE

## 2023-12-14 VITALS
WEIGHT: 185.8 LBS | HEART RATE: 63 BPM | OXYGEN SATURATION: 97 % | HEIGHT: 69 IN | DIASTOLIC BLOOD PRESSURE: 68 MMHG | SYSTOLIC BLOOD PRESSURE: 124 MMHG | BODY MASS INDEX: 27.52 KG/M2

## 2023-12-14 DIAGNOSIS — Z95.0 PRESENCE OF CARDIAC PACEMAKER: ICD-10-CM

## 2023-12-14 DIAGNOSIS — I48.0 PAF (PAROXYSMAL ATRIAL FIBRILLATION): Primary | ICD-10-CM

## 2023-12-14 RX ORDER — ASPIRIN 81 MG/1
81 TABLET ORAL DAILY
Start: 2023-12-14

## 2023-12-20 NOTE — PROGRESS NOTES
.     REASON FOR FOLLOWUP:     Provide an opinion on any further workup or treatment of iron deficiency anemia                                 HISTORY OF PRESENT ILLNESS:  The patient is a 68 y.o. year old female who presents today for lab review and follow up.  She last required IV iron in the form of Injectafer on 3/17/2023 and 3/31/2023.  She does report a fall in August, causing her to break her right tibia.  She has had decreased mobility because of this.  She has been working with physical therapy, and continues to work with them.  The fall was caused by her losing her balance going down the stairs.  She denied any dizziness or lightheadedness leading to the fall.  She is currently feeling fatigued, however has a lot going on personally that she feels could be contributed to this.  She denies ice craving, shortness of breath, dizziness, lightheadedness.  She also has been taking liquid vitamin B12 (OTC), and does not wish to proceed with any further B12 injections unless her level is low.  She also reports that after receiving her Reclast injection, she was in bed for 3 days with severe arthralgias.  She has no new concerns today.    Past Medical History:   Diagnosis Date    Anemia     Arthritis     ASHD (arteriosclerotic heart disease)     Carcinoma     Basal Cell/Squamous Cell    Coronary artery calcification     Diabetes insipidus     Diabetes mellitus     Disease of thyroid gland     Heart attack     Heart murmur     Hiatal hernia     History of blood transfusion     Hyperlipidemia     Hypertension     Hypothyroidism     Mantoux: positive     Non Hodgkin's lymphoma     Osteopenia     Plantar fasciitis     Type 2 diabetes mellitus     Ulcer of abdomen wall     Ulcers of both great toes     Vitamin D deficiency      Past Surgical History:   Procedure Laterality Date    ANGIOPLASTY      ANGIOPLASTY  2009    CARDIAC CATHETERIZATION      Stents     SECTION      DENTAL PROCEDURE       DILATATION AND CURETTAGE      ENDOSCOPY AND COLONOSCOPY  2016    FOOT SURGERY Left     GASTRIC BYPASS  2007    GASTRIC RESTRICTION SURGERY  See my chart    HERNIA REPAIR      KNEE SURGERY Left     Total Knee    LUNG REMOVAL, PARTIAL  2012    MOHS SURGERY N/A     Face and head    PACEMAKER IMPLANTATION  2022    REPLACEMENT TOTAL KNEE Bilateral     TONSILLECTOMY       HEMATOLOGY HISTORY:   The patient is a 67 y.o. year old female who presented today 3/9/2023 for initial evaluation of anemia.     The patient reports she had non-Hodgkin's lymphoma from 1999 to 2000. She was treated with CHOP and radiation. She was treated in [place]. She had the lymphoma in her nasal cavity, going down the back of her throat, and lost her hearing. They found it also in her right upper lung. She relays she had chemotherapy. She was taking prednisone for 5 days. She has been doing okay.  Patient also had a reading therapy to her right jaw area.     The patient reports she had a gastric bypass in 2007. She goes into anemia at least once a year. Her last B12 shot was 1 year ago. Her last I.V iron was 1 year ago. She reports she is tired and chewing ice cubes.    In summary, patient reports she has continued significant fatigue. She is having pica for ice chips. She reports has been given IV iron therapy multiple times since her gastric bypass in 2007, last intravenous iron therapy was about 1 year ago. She also was given B12 injection and the last dose was also about 1 year ago.    She had first heart attack in 1996 and had 2 stents placed at that time. She had 2 stents placed later on. No CHF.    Laboratory studies today on 3/9/2023 reported mild anemia hemoglobin 11.1, MCV 93.8 MCHC 32.2, normal platelets 275,000 WBC 10,120 including neutrophils 6980 and lymphocytes 2140.      MEDICATIONS    Current Outpatient Medications:     acetaminophen (TYLENOL) 500 MG tablet, Take 1 tablet by mouth Every 6 (Six) Hours As Needed for Mild  Pain., Disp: , Rfl:     albuterol sulfate  (90 Base) MCG/ACT inhaler, Inhale 2 puffs Every 4 (Four) Hours As Needed for Wheezing., Disp: , Rfl:     ALPRAZolam (Xanax) 0.5 MG tablet, Take 0.5 tablets by mouth Daily As Needed for Anxiety., Disp: 15 tablet, Rfl: 2    aspirin 81 MG EC tablet, Take 1 tablet by mouth Daily., Disp: , Rfl:     Cholecalciferol (vitamin D3) 125 MCG (5000 UT) tablet tablet, Take 1 tablet by mouth Daily., Disp: , Rfl:     Cyanocobalamin 1000 MCG/ML kit, Inject 1,000 mcg as directed Every 30 (Thirty) Days., Disp: 3 kit, Rfl: 4    Diclofenac Sodium 3 % gel gel, Apply  topically to the appropriate area as directed 2 (Two) Times a Day. for 60 days., Disp: , Rfl:     diphenhydrAMINE-APAP, sleep, (TYLENOL PM EXTRA STRENGTH PO), Take 500 mg by mouth Daily As Needed. 1 to 2 tablets as needed, Disp: , Rfl:     fluticasone (FLONASE) 50 MCG/ACT nasal spray, 2 sprays into the nostril(s) as directed by provider As Needed., Disp: , Rfl:     folic acid (FOLVITE) 1 MG tablet, Take 1 tablet by mouth Daily., Disp: 90 tablet, Rfl: 3    glucose blood test strip, E11.9, test once per day, Disp: 90 each, Rfl: 1    glucose monitor monitoring kit, 1 each Daily. Dispense glucometer with brand based off insurance coverage, Disp: 1 each, Rfl: 0    levothyroxine (Synthroid) 112 MCG tablet, Take 1 tablet by mouth Daily., Disp: 30 tablet, Rfl: 11    metFORMIN (GLUCOPHAGE) 500 MG tablet, Take 1 tablet by mouth 2 (Two) Times a Day With Meals., Disp: 180 tablet, Rfl: 3    metoprolol tartrate (LOPRESSOR) 25 MG tablet, Take 0.5 tablets by mouth 2 (Two) Times a Day., Disp: 90 tablet, Rfl: 3    mometasone (NASONEX) 50 MCG/ACT nasal spray, 2 sprays into the nostril(s) as directed by provider Daily., Disp: , Rfl:     nitroglycerin (NITROSTAT) 0.4 MG SL tablet, Place 1 tablet under the tongue Every 5 (Five) Minutes As Needed for Chest Pain. Take no more than 3 doses in 15 minutes., Disp: 25 tablet, Rfl: 2    pantoprazole  "(Protonix) 40 MG EC tablet, Take 1 tablet by mouth Daily., Disp: 90 tablet, Rfl: 1    rosuvastatin (CRESTOR) 20 MG tablet, Take 1 tablet by mouth Daily., Disp: 90 tablet, Rfl: 3    sertraline (Zoloft) 50 MG tablet, Take 1 tablet by mouth Daily., Disp: 90 tablet, Rfl: 3    SITagliptin (Januvia) 100 MG tablet, By mouth take one tab daily., Disp: 90 tablet, Rfl: 3    Syringe 23G X 1\" 3 ML misc, Inject 1 syringe under the skin into the appropriate area as directed Every 30 (Thirty) Days., Disp: 50 each, Rfl: 1    tiZANidine (ZANAFLEX) 2 MG tablet, Take 1 tablet by mouth Every 8 (Eight) Hours As Needed for Muscle Spasms., Disp: 90 tablet, Rfl: 1    traMADol-acetaminophen (ULTRACET) 37.5-325 MG per tablet, Take 1 tablet by mouth Every 6 (Six) Hours As Needed for Moderate Pain. Take 1 tablet by mouth At Night As Needed for Moderate Pain, Disp: 30 tablet, Rfl: 1    zolpidem CR (AMBIEN CR) 6.25 MG CR tablet, Take 1 tablet by mouth At Night As Needed for Sleep., Disp: 30 tablet, Rfl: 0    ALLERGIES:     Allergies   Allergen Reactions    Claritin [Loratadine] Palpitations    Celebrex [Celecoxib] Unknown - Low Severity    Fish-Derived Products GI Intolerance and Nausea And Vomiting    Zocor [Simvastatin] Myalgia    Allegra [Fexofenadine] Palpitations    Pseudoephedrine Palpitations    Wool Alcohol [Lanolin] Rash    Zithromax [Azithromycin] Rash    Zyrtec [Cetirizine] Palpitations       SOCIAL HISTORY:       Social History     Socioeconomic History    Marital status: Legally     Number of children: 2   Tobacco Use    Smoking status: Former     Packs/day: 2.50     Years: 20.00     Additional pack years: 0.00     Total pack years: 50.00     Types: Cigarettes     Start date: 1969     Quit date: 1996     Years since quittin.9    Smokeless tobacco: Never   Vaping Use    Vaping Use: Never used   Substance and Sexual Activity    Alcohol use: Not Currently    Drug use: Never    Sexual activity: Not Currently       " "  FAMILY HISTORY:  Family History   Problem Relation Age of Onset    Hyperlipidemia Mother     Heart attack Mother     Arthritis Mother     Heart disease Mother     Hypertension Mother             Diabetes Mother             Obesity Mother     Uterine cancer Mother     Hyperlipidemia Father     Arthritis Father     Kidney disease Father     Heart attack Father     Heart disease Father     Hypertension Father             Obesity Father     Hyperlipidemia Brother     Heart disease Brother     Arthritis Brother     Diabetes Brother             Hypertension Brother             Obesity Brother     Anxiety disorder Daughter     ADD / ADHD Daughter     Breast cancer Paternal Aunt     Tuberculosis Paternal Aunt     Cancer Paternal Aunt         Breast    Diabetes Maternal Grandmother             Colon cancer Maternal Grandmother     Cancer Maternal Grandmother         Colon    Heart disease Paternal Grandfather        REVIEW OF SYSTEMS:  Review of Systems see HPI.           Vitals:    23 1337   BP: 150/78   Pulse: 85   Resp: 18   Temp: 97.3 °F (36.3 °C)   TempSrc: Temporal   SpO2: 95%   Weight: 84.9 kg (187 lb 1.6 oz)   Height: 175.3 cm (69.02\")   PainSc: 0-No pain           2023     1:38 PM   Current Status   ECOG score 0   Limited due to video visit.   PHYSICAL EXAM:    GENERAL:  Well-developed, well-nourished female in no acute distress.  Orientated to time place and the people.  SKIN:  Warm, dry without rashes, purpura or petechiae.  HEENT:  Normocephalic.   LYMPHATICS:  No cervical, supraclavicular adenopathy.  CHEST: Normal respiratory effort.  Lungs clear to auscultation. Good airflow.  CARDIAC:  Regular rate and rhythm without murmurs. Normal S1,S2.  ABDOMEN:  Soft, nontender with no organomegaly or masses.  Bowel sounds normal.  EXTREMITIES:  No lower extremity edema.      RECENT LABS:        WBC   Date Value Ref Range Status   2023 8.56 3.40 - 10.80 " 10*3/mm3 Final   07/13/2023 6.92 3.40 - 10.80 10*3/mm3 Final   06/29/2023 7.30 3.40 - 10.80 10*3/mm3 Final   05/08/2023 6.0 3.4 - 10.8 x10E3/uL Final   03/09/2023 10.12 3.40 - 10.80 10*3/mm3 Final   01/11/2023 7.1 3.4 - 10.8 x10E3/uL Final     Hemoglobin   Date Value Ref Range Status   12/21/2023 11.4 (L) 12.0 - 15.9 g/dL Final   07/13/2023 13.0 12.0 - 15.9 g/dL Final   06/29/2023 13.3 12.0 - 15.9 g/dL Final   05/08/2023 12.8 11.1 - 15.9 g/dL Final   03/09/2023 11.1 (L) 12.0 - 15.9 g/dL Final   01/11/2023 11.6 11.1 - 15.9 g/dL Final     Platelets   Date Value Ref Range Status   12/21/2023 245 140 - 450 10*3/mm3 Final   07/13/2023 241 140 - 450 10*3/mm3 Final   06/29/2023 244 140 - 450 10*3/mm3 Final   05/08/2023 253 150 - 450 x10E3/uL Final   03/09/2023 275 140 - 450 10*3/mm3 Final   01/11/2023 269 150 - 450 x10E3/uL Final     Lab Results   Component Value Date    GLUCOSE 122 (H) 07/13/2023    BUN 13 07/13/2023    CREATININE 0.90 07/13/2023    EGFRRESULT 81 05/08/2023    EGFR 69.8 07/13/2023    BCR 14.4 07/13/2023    K 4.2 07/13/2023    CO2 25.5 07/13/2023    CALCIUM 9.5 07/13/2023    PROTENTOTREF 6.6 05/08/2023    ALBUMIN 3.9 07/13/2023    BILITOT 0.4 07/13/2023    AST 22 07/13/2023    ALT 5 07/13/2023         Assessment & Plan     1. Iron deficiency anemia post R-on-Y gastric bypass for weight losing purpose in 2007.   This patient has poor iron absorption because of that.   I recommended to obtain iron studies today to document a new iron level, and schedule patient for intravenous iron therapy with Injectafer 750 mg weekly for two doses. If insurance declines that, we will treat her with Venofer 300 mg for 5 doses.   Her iron needs to be monitored periodically because she will develop iron deficiency again due to poor absorption of iron due to change of a gastric structure.  Laboratory studies on 03/09/2023 reported iron deficiency with ferritin 26 ng/mL, iron saturation 8%, free iron 38, TIBC 481.  The patient  received 2 doses of Injectafer in 03/2023.   Post-therapy laboratory study on 05/08/2023 reported supratherapeutic ferritin 633, with free iron 118, TIBC 42%. The patient had normalized hemoglobin 12.8. Laboratory study today on 06/16/2023 showed a further improved hemoglobin 13.3. However, trending down ferritin at the 424 ng/mL, with iron saturation 35%, free iron 123, TIBC 347.  12/21/2023: hemoglobin 11.4, iron saturation 21%, ferritin 306.    2. Vitamin B12 deficiency also due to gastric bypass.   I recommended to obtain vitamin B12 level today to evaluate. If low, she will be started on vitamin B12 injection. I discussed with her about possible self injection and if her insurance approves that, she is diabetic and has been given insulin injection.  Labor study on 03/09/2023 reported B12 level at 272 mcg/mL. The patient was started on monthly injection.   Lab study on 05/08/2023 reported improved B12 level at 409. Today, laboratory studies is pending. Discussed with the patient, she will continue her monthly injection.  Since her last visit she has been taking a liquid OTC vitamin B12.  She wishes to continue this instead of injections, unless her B12 level drops.  Vitamin B12 level today greater than 2000.    3. History of non-Hodgkin's lymphoma diagnosed 20 years ago in 1999 to 2020 and was given chemotherapy CHOP together with radiation therapy. She had last scan 2 years ago with no evidence for disease recurrence. I discussed with her that since she is not having any symptoms, I do not think she needs to have a routine imaging studies according to NCCN guideline.      4. Folate deficiency  Laboratory study on 03/09/2023 reported folate level 4.2 ng/mL. The patient was started on oral folic acid 1 mg daily.   Repeat laboratory studies on 05/08/2023 reported excellent folate level > 20 ng/mL. She will continue that for now.    5. Osteoporosis   This patient has documented osteoporosis by bone density scan  from 06/02/2023 with left hip T-score -2.5, right hip T-score -2.1, and the lumbar T-score 0.6.   The patient reports that she was not able to tolerate the oral calcium because of the gastric bypass procedure. She was asking whether we can treat her osteoporosis. Discussed with the patient today that we could give her Reclast annually. I did discuss with the patient that bisphosphonate such as Reclast could have causes necrosis of jaw bone. The patient reports she is not scared much about it because otherwise she is not going to improve her osteoporosis.  Reclast given July 2023.    PLAN:   Currently no need for iron replacement.  Continues OTC vitamin B12.   Continue Reclast annually.  Last given July 2023.  Continue folic acid at 1 mg daily.  Return in 6 months for CBC, ferritin, iron profile, and vitamin B12 level and MD visit.  Patient understands if she starts craving ice, experiences worsening fatigue, etc. that she may return sooner for lab check.      CC:  Lenin Rashid MD

## 2023-12-21 ENCOUNTER — LAB (OUTPATIENT)
Dept: OTHER | Facility: HOSPITAL | Age: 68
End: 2023-12-21
Payer: MEDICARE

## 2023-12-21 ENCOUNTER — TELEMEDICINE (OUTPATIENT)
Dept: ONCOLOGY | Facility: CLINIC | Age: 68
End: 2023-12-21
Payer: MEDICARE

## 2023-12-21 VITALS
BODY MASS INDEX: 27.71 KG/M2 | RESPIRATION RATE: 18 BRPM | SYSTOLIC BLOOD PRESSURE: 150 MMHG | DIASTOLIC BLOOD PRESSURE: 78 MMHG | HEIGHT: 69 IN | OXYGEN SATURATION: 95 % | TEMPERATURE: 97.3 F | HEART RATE: 85 BPM | WEIGHT: 187.1 LBS

## 2023-12-21 DIAGNOSIS — E53.8 FOLATE DEFICIENCY: ICD-10-CM

## 2023-12-21 DIAGNOSIS — K91.89 IRON DEFICIENCY ANEMIA AFTER GASTRECTOMY: Primary | ICD-10-CM

## 2023-12-21 DIAGNOSIS — D50.8 IRON DEFICIENCY ANEMIA AFTER GASTRECTOMY: Primary | ICD-10-CM

## 2023-12-21 DIAGNOSIS — E53.8 B12 DEFICIENCY: ICD-10-CM

## 2023-12-21 DIAGNOSIS — Z78.0 OSTEOPENIA AFTER MENOPAUSE: ICD-10-CM

## 2023-12-21 DIAGNOSIS — M85.80 OSTEOPENIA AFTER MENOPAUSE: ICD-10-CM

## 2023-12-21 DIAGNOSIS — D50.8 IRON DEFICIENCY ANEMIA AFTER GASTRECTOMY: ICD-10-CM

## 2023-12-21 DIAGNOSIS — K91.89 IRON DEFICIENCY ANEMIA AFTER GASTRECTOMY: ICD-10-CM

## 2023-12-21 LAB
BASOPHILS # BLD AUTO: 0.05 10*3/MM3 (ref 0–0.2)
BASOPHILS NFR BLD AUTO: 0.6 % (ref 0–1.5)
DEPRECATED RDW RBC AUTO: 45.2 FL (ref 37–54)
EOSINOPHIL # BLD AUTO: 0.15 10*3/MM3 (ref 0–0.4)
EOSINOPHIL NFR BLD AUTO: 1.8 % (ref 0.3–6.2)
ERYTHROCYTE [DISTWIDTH] IN BLOOD BY AUTOMATED COUNT: 12.8 % (ref 12.3–15.4)
FERRITIN SERPL-MCNC: 306.8 NG/ML (ref 13–150)
HCT VFR BLD AUTO: 35.5 % (ref 34–46.6)
HGB BLD-MCNC: 11.4 G/DL (ref 12–15.9)
IMM GRANULOCYTES # BLD AUTO: 0.02 10*3/MM3 (ref 0–0.05)
IMM GRANULOCYTES NFR BLD AUTO: 0.2 % (ref 0–0.5)
IRON 24H UR-MRATE: 69 MCG/DL (ref 37–145)
IRON SATN MFR SERPL: 21 % (ref 20–50)
LYMPHOCYTES # BLD AUTO: 1.56 10*3/MM3 (ref 0.7–3.1)
LYMPHOCYTES NFR BLD AUTO: 18.2 % (ref 19.6–45.3)
MCH RBC QN AUTO: 30.8 PG (ref 26.6–33)
MCHC RBC AUTO-ENTMCNC: 32.1 G/DL (ref 31.5–35.7)
MCV RBC AUTO: 95.9 FL (ref 79–97)
MONOCYTES # BLD AUTO: 0.59 10*3/MM3 (ref 0.1–0.9)
MONOCYTES NFR BLD AUTO: 6.9 % (ref 5–12)
NEUTROPHILS NFR BLD AUTO: 6.19 10*3/MM3 (ref 1.7–7)
NEUTROPHILS NFR BLD AUTO: 72.3 % (ref 42.7–76)
NRBC BLD AUTO-RTO: 0 /100 WBC (ref 0–0.2)
PLATELET # BLD AUTO: 245 10*3/MM3 (ref 140–450)
PMV BLD AUTO: 9.3 FL (ref 6–12)
RBC # BLD AUTO: 3.7 10*6/MM3 (ref 3.77–5.28)
TIBC SERPL-MCNC: 325 MCG/DL (ref 298–536)
TRANSFERRIN SERPL-MCNC: 218 MG/DL (ref 200–360)
VIT B12 BLD-MCNC: >2000 PG/ML (ref 211–946)
WBC NRBC COR # BLD AUTO: 8.56 10*3/MM3 (ref 3.4–10.8)

## 2023-12-21 PROCEDURE — 36415 COLL VENOUS BLD VENIPUNCTURE: CPT

## 2023-12-21 PROCEDURE — 82607 VITAMIN B-12: CPT | Performed by: INTERNAL MEDICINE

## 2023-12-21 PROCEDURE — 84466 ASSAY OF TRANSFERRIN: CPT | Performed by: INTERNAL MEDICINE

## 2023-12-21 PROCEDURE — 82728 ASSAY OF FERRITIN: CPT | Performed by: INTERNAL MEDICINE

## 2023-12-21 PROCEDURE — 83540 ASSAY OF IRON: CPT | Performed by: INTERNAL MEDICINE

## 2023-12-21 PROCEDURE — 85025 COMPLETE CBC W/AUTO DIFF WBC: CPT | Performed by: INTERNAL MEDICINE

## 2023-12-26 ENCOUNTER — TELEPHONE (OUTPATIENT)
Dept: ONCOLOGY | Facility: CLINIC | Age: 68
End: 2023-12-26
Payer: MEDICARE

## 2023-12-26 NOTE — TELEPHONE ENCOUNTER
Caller: Zawada, Dorothy J    Relationship: Self    Best call back number: [phone]    What is the best time to reach you: ANYTIME    Who are you requesting to speak with (clinical staff, provider, specific staff member): CLINICAL    What was the call regarding: PLEASE MAIL PATIENT A COPY OF HER 12/21 LAB RESULTS:     [address] [place] [address]

## 2024-01-06 DIAGNOSIS — G47.00 INSOMNIA, UNSPECIFIED TYPE: ICD-10-CM

## 2024-01-08 RX ORDER — ZOLPIDEM TARTRATE 6.25 MG/1
6.25 TABLET, FILM COATED, EXTENDED RELEASE ORAL NIGHTLY PRN
Qty: 30 TABLET | Refills: 0 | Status: SHIPPED | OUTPATIENT
Start: 2024-01-08

## 2024-01-12 DIAGNOSIS — K21.9 GASTROESOPHAGEAL REFLUX DISEASE WITHOUT ESOPHAGITIS: ICD-10-CM

## 2024-01-15 RX ORDER — PANTOPRAZOLE SODIUM 40 MG/1
40 TABLET, DELAYED RELEASE ORAL DAILY
Qty: 90 TABLET | Refills: 3 | Status: SHIPPED | OUTPATIENT
Start: 2024-01-15

## 2024-01-23 ENCOUNTER — OFFICE VISIT (OUTPATIENT)
Dept: FAMILY MEDICINE CLINIC | Facility: CLINIC | Age: 69
End: 2024-01-23
Payer: MEDICARE

## 2024-01-23 VITALS
HEART RATE: 67 BPM | HEIGHT: 69 IN | SYSTOLIC BLOOD PRESSURE: 118 MMHG | BODY MASS INDEX: 27.37 KG/M2 | OXYGEN SATURATION: 97 % | TEMPERATURE: 97.1 F | WEIGHT: 184.8 LBS | DIASTOLIC BLOOD PRESSURE: 64 MMHG

## 2024-01-23 DIAGNOSIS — E53.8 B12 DEFICIENCY: ICD-10-CM

## 2024-01-23 DIAGNOSIS — J34.89 RHINORRHEA: ICD-10-CM
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
## 2024-01-23 DIAGNOSIS — S70.261D: ICD-10-CM

## 2024-01-23 DIAGNOSIS — F41.9 ANXIETY: ICD-10-CM

## 2024-01-23 DIAGNOSIS — G47.00 INSOMNIA, UNSPECIFIED TYPE: Primary | ICD-10-CM

## 2024-01-23 DIAGNOSIS — R25.2 BILATERAL LEG CRAMPS: ICD-10-CM

## 2024-01-23 PROCEDURE — 3074F SYST BP LT 130 MM HG: CPT | Performed by: STUDENT IN AN ORGANIZED HEALTH CARE EDUCATION/TRAINING PROGRAM

## 2024-01-23 PROCEDURE — 99214 OFFICE O/P EST MOD 30 MIN: CPT | Performed by: STUDENT IN AN ORGANIZED HEALTH CARE EDUCATION/TRAINING PROGRAM

## 2024-01-23 PROCEDURE — 3078F DIAST BP <80 MM HG: CPT | Performed by: STUDENT IN AN ORGANIZED HEALTH CARE EDUCATION/TRAINING PROGRAM

## 2024-01-23 RX ORDER — ZOLPIDEM TARTRATE 6.25 MG/1
6.25 TABLET, FILM COATED, EXTENDED RELEASE ORAL NIGHTLY PRN
Qty: 90 TABLET | Refills: 1 | Status: SHIPPED | OUTPATIENT
Start: 2024-01-23

## 2024-01-23 RX ORDER — ZOLPIDEM TARTRATE 1.75 MG/1
1 TABLET SUBLINGUAL NIGHTLY PRN
Qty: 30 TABLET | Refills: 3 | Status: SHIPPED | OUTPATIENT
Start: 2024-01-23

## 2024-01-23 RX ORDER — EAR PLUGS
EACH OTIC (EAR)
COMMUNITY

## 2024-01-23 NOTE — PROGRESS NOTES
"Chief Complaint  Insomnia, Anxiety, urinary hesitancy, Nasal Congestion (4 days ), and Headache    Subjective        Dorothy J Zawada presents to [facility]  History of Present Illness  Lissette is an established patient presenting for follow-up for insomnia and anxiety, nasal congestion.  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 Family history of breast cancer in aunt, and grandmother with colon cancer. She is a former smoker.  She follows with multiple specialists including endocrinology, cardiology, hematology, orthopedic surgery.  Will be seeing electrophysiologist in December to discuss Watchman.    Rhinorrhea: Nose has been running with clear fluid for 4 days.  No headaches, fevers, chills, congestion, cough, sore throat, swollen glands, shortness of breath, wheezing.    Insomnia: Reports now sleeping about 5 hours per night with Ambien CR, but often awakens and has difficulty going back to sleep.  Asking about supplementing with low-dose to get her through the night.  She has not had any worsening of her balance.    Anxiety: Reports symptoms are well-managed currently with Zoloft.    Lower extremity edema/leg cramps: Reports good relief from cramping with tizanidine.  Is having some right lower extremity edema occasionally when not wearing her compression stockings.    B12 deficiency: Now taking liquid supplement, no longer taking B12 injections.  Hematology is following.    Leg pain: Now improved s/p foot fracture.    Glaucoma: Following with ophthalmology.  Feels vision is worsening.    Objective   Vital Signs:  /64 (BP Location: Right arm, Patient Position: Sitting)   Pulse 67   Temp 97.1 °F (36.2 °C) (Temporal)   Ht 175.3 cm (69.02\")   Wt 83.8 kg (184 lb 12.8 oz)   SpO2 97%   BMI 27.28 " "kg/m²   Estimated body mass index is 27.28 kg/m² as calculated from the following:    Height as of this encounter: 175.3 cm (69.02\").    Weight as of this encounter: 83.8 kg (184 lb 12.8 oz).               Physical Exam  Vitals and nursing note reviewed.   Constitutional:       General: She is awake. She is not in acute distress.     Appearance: Normal appearance.   HENT:      Head: Normocephalic and atraumatic.      Salivary Glands: Right salivary gland is not diffusely enlarged or tender. Left salivary gland is not diffusely enlarged or tender.      Right Ear: Tympanic membrane, ear canal and external ear normal. There is no impacted cerumen.      Left Ear: Tympanic membrane, ear canal and external ear normal. There is no impacted cerumen.      Nose: Congestion and rhinorrhea present.      Right Sinus: Maxillary sinus tenderness present. No frontal sinus tenderness.      Left Sinus: No maxillary sinus tenderness or frontal sinus tenderness.      Mouth/Throat:      Mouth: Mucous membranes are moist.      Pharynx: Oropharynx is clear. No oropharyngeal exudate or posterior oropharyngeal erythema.      Tonsils: No tonsillar exudate.   Eyes:      Extraocular Movements: Extraocular movements intact.   Neck:      Thyroid: No thyroid mass, thyromegaly or thyroid tenderness.   Cardiovascular:      Rate and Rhythm: Normal rate and regular rhythm.      Heart sounds: No murmur heard.     No friction rub. No gallop.   Pulmonary:      Effort: Pulmonary effort is normal.      Breath sounds: Normal breath sounds. No wheezing, rhonchi or rales.   Musculoskeletal:      Cervical back: Normal range of motion and neck supple. No rigidity or tenderness.   Lymphadenopathy:      Cervical: No cervical adenopathy.   Skin:     General: Skin is warm and dry.      Comments: 2 cm edilberto scar on right hip at site of previous spider bite eschar.  Nontender, well-healed, no drainage.   Neurological:      General: No focal deficit present.      " Mental Status: She is alert.   Psychiatric:         Mood and Affect: Mood normal.         Behavior: Behavior normal. Behavior is cooperative.        Result Review :                     Assessment and Plan     Diagnoses and all orders for this visit:    1. Insomnia, unspecified type (Primary)  -     Zolpidem Tartrate 1.75 MG sublingual tablet; Place 1 tablet under the tongue At Night As Needed (nighttime awakening).  Dispense: 30 tablet; Refill: 3  -     zolpidem CR (AMBIEN CR) 6.25 MG CR tablet; Take 1 tablet by mouth At Night As Needed for Sleep.  Dispense: 90 tablet; Refill: 1    2. Anxiety    3. Rhinorrhea    4. Bilateral leg cramps    5. Insect bite of right hip, subsequent encounter    6. B12 deficiency    Insomnia: Seems to be doing well on current Ambien CR so continuing.  Has not had issues with falls related to this medication.  Will add low sublingual dose as needed to help sleep through the night.  Pepe checked and consistent.  UDS on file.  Controlled substance contract signed.    Anxiety: Continue current Zoloft.    Rhinorrhea: Symptoms and exam consistent with viral URI.  Patient appears to be managing well with supportive care.  Return precautions provided regarding worsening of symptoms.    Lower extremity edema/leg cramps: Encouraged continued compression stockings.  No lower extremity edema on exam today.  Patient appears to be getting good benefit from tizanidine and is not having side effects.  Continuing.    Spider bite: On right hip appears well-healed based on exam.    B12 deficiency: Continue following with hematology.    Preventative:    PAP: History of positive Pap    Mammogram: 6/2/2023, will repeat in 2 years    Colonoscopy: 5/18/2016    Dexa: 6/2/2023    Vaccinations: Reports pneumococcal from Jayden Nova MD, [place], [place] ~2021, records requested.  Has thought about shingles shot.    Eye: 2/17/2023    Dental: Deferred    Foot exams: 6/28/2023    Home: Lives at home with  daughter, sister-in-law, niece, granddaughter, feels safe.    Work: Deferred    Social: Deferred  Exercise: None currently    Diet: Deferred       I spent 37 minutes caring for Lissette on this date of service. This time includes time spent by me in the following activities:preparing for the visit, reviewing tests, obtaining and/or reviewing a separately obtained history, performing a medically appropriate examination and/or evaluation , counseling and educating the patient/family/caregiver, ordering medications, tests, or procedures, and documenting information in the medical record  Follow Up     Return in about 6 months (around 7/15/2024) for Annual physical.  Patient was given instructions and counseling regarding her condition or for health maintenance advice. Please see specific information pulled into the AVS if appropriate.

## 2024-02-18 DIAGNOSIS — G47.00 INSOMNIA, UNSPECIFIED TYPE: ICD-10-CM

## 2024-02-18 DIAGNOSIS — I10 PRIMARY HYPERTENSION: ICD-10-CM

## 2024-02-18 DIAGNOSIS — F41.9 ANXIETY: ICD-10-CM

## 2024-02-20 RX ORDER — ALPRAZOLAM 0.5 MG/1
0.25 TABLET ORAL DAILY PRN
Qty: 15 TABLET | Refills: 2 | Status: SHIPPED | OUTPATIENT
Start: 2024-02-20

## 2024-02-23 DIAGNOSIS — F41.9 ANXIETY: ICD-10-CM

## 2024-02-23 DIAGNOSIS — M79.10 MUSCLE ACHE: ICD-10-CM

## 2024-02-23 DIAGNOSIS — R25.2 BILATERAL LEG CRAMPS: ICD-10-CM

## 2024-02-23 DIAGNOSIS — R53.83 FATIGUE, UNSPECIFIED TYPE: ICD-10-CM

## 2024-02-23 DIAGNOSIS — G47.00 INSOMNIA, UNSPECIFIED TYPE: ICD-10-CM

## 2024-02-23 RX ORDER — TIZANIDINE 2 MG/1
2 TABLET ORAL EVERY 8 HOURS PRN
Qty: 90 TABLET | Refills: 3 | Status: SHIPPED | OUTPATIENT
Start: 2024-02-23

## 2024-02-23 RX ORDER — ALPRAZOLAM 0.5 MG/1
TABLET ORAL
Qty: 15 TABLET | OUTPATIENT
Start: 2024-02-23

## 2024-02-29 ENCOUNTER — OFFICE VISIT (OUTPATIENT)
Dept: OBSTETRICS AND GYNECOLOGY | Age: 69
End: 2024-02-29
Payer: MEDICARE

## 2024-02-29 VITALS
DIASTOLIC BLOOD PRESSURE: 70 MMHG | BODY MASS INDEX: 27.55 KG/M2 | HEIGHT: 69 IN | WEIGHT: 186 LBS | SYSTOLIC BLOOD PRESSURE: 128 MMHG

## 2024-02-29 DIAGNOSIS — Z12.31 SCREENING MAMMOGRAM FOR BREAST CANCER: ICD-10-CM

## 2024-02-29 DIAGNOSIS — Z13.89 SCREENING FOR BLOOD OR PROTEIN IN URINE: ICD-10-CM

## 2024-02-29 DIAGNOSIS — N81.10 CYSTOCELE WITHOUT UTERINE PROLAPSE: Primary | ICD-10-CM

## 2024-02-29 LAB
BILIRUB BLD-MCNC: NEGATIVE MG/DL
GLUCOSE UR STRIP-MCNC: NEGATIVE MG/DL
KETONES UR QL: NEGATIVE
LEUKOCYTE EST, POC: NEGATIVE
NITRITE UR-MCNC: NEGATIVE MG/ML
PH UR: 5.5 [PH] (ref 5–8)
PROT UR STRIP-MCNC: NEGATIVE MG/DL
RBC # UR STRIP: NEGATIVE /UL
SP GR UR: 1.01 (ref 1–1.03)
UROBILINOGEN UR QL: NORMAL

## 2024-02-29 NOTE — PROGRESS NOTES
"Subjective     History of Present Illness  Dorothy J Zawada is a 68 y.o.  female is being seen today for   Chief Complaint   Patient presents with    Gynecologic Exam     CC: Bladder incontinence problems , last pap 23 neg, HPV neg, last mammo 4/15/21 neg, unable to void     C/o full bladder x 5 days.  Reports that urinary frequency has increased but with little urine produced.  States that she leaves the bathroom feeling like her bladder is still full.  Reports that she feels leaky, but does not have urine on her underwear.  She always makes it to the bathroom.  BM are sometimes normal. Admits she does not drink much water.  Denies pain with urination or fevers. No caffeine, no smoking.  No changes in medication other than adding Ambien sublingual. Leaky. Makes it to bathroom. BM sometimes normal.   Last mammogram in  was normal.  Needs mammogram order.  Will have Medicare annual exam next year.     The following portions of the patient's history were reviewed and updated as appropriate: allergies, current medications, past family history, past medical history, past social history, past surgical history and problem list.    /70   Ht 175.3 cm (69\")   Wt 84.4 kg (186 lb)   BMI 27.47 kg/m²         Review of Systems   Constitutional: Negative.    HENT: Negative.     Eyes: Negative.    Respiratory: Negative.     Cardiovascular: Negative.    Gastrointestinal: Negative.    Endocrine: Negative.    Genitourinary:  Positive for frequency.   Musculoskeletal: Negative.    Skin: Negative.    Allergic/Immunologic: Negative.    Neurological: Negative.    Hematological: Negative.    Psychiatric/Behavioral: Negative.         Objective   Physical Exam  Constitutional:       General: She is not in acute distress.  Cardiovascular:      Rate and Rhythm: Normal rate and regular rhythm.      Pulses: Normal pulses.      Heart sounds: Normal heart sounds.   Pulmonary:      Effort: Pulmonary effort is normal.      " Breath sounds: Normal breath sounds.   Genitourinary:     Exam position: Lithotomy position.      Labia:         Right: No rash, tenderness or lesion.         Left: No rash, tenderness or lesion.       Comments: Grade 1 cystocele present, without uterine prolapse.    Neurological:      General: No focal deficit present.      Mental Status: She is alert and oriented to person, place, and time.   Psychiatric:         Mood and Affect: Mood normal.         Behavior: Behavior normal.         Thought Content: Thought content normal.         Judgment: Judgment normal.           Assessment & Plan   Diagnoses and all orders for this visit:    1. Cystocele without uterine prolapse (Primary)    2. Screening for blood or protein in urine  -     POC Urinalysis Dipstick    3. Screening mammogram for breast cancer  -     Mammo Screening Digital Tomosynthesis Bilateral With CAD; Future    -UA negative for UTI  -Recommended Kegel exercises for cystocele.  Reviewed technique with patient, she verbalizes understanding and is familiar with the technique.  Advised patient to do three sets of 8-12 contractions sustained for 5-10 I can see each daily.  Discussed with patient that results may take up to 15 weeks, if she does not see results by then or is wanting results sooner I can send a referral to pelvic floor PT.  -Mammo ordered    All questions answered. Patient verbalizes understanding and is agreeable to plan.  Return in about 1 year (around 2/28/2025) for Medicare annual exam.

## 2024-04-12 ENCOUNTER — OFFICE VISIT (OUTPATIENT)
Dept: CARDIOLOGY | Facility: CLINIC | Age: 69
End: 2024-04-12
Payer: MEDICARE

## 2024-04-12 VITALS
WEIGHT: 187 LBS | HEIGHT: 69 IN | BODY MASS INDEX: 27.7 KG/M2 | DIASTOLIC BLOOD PRESSURE: 78 MMHG | RESPIRATION RATE: 18 BRPM | HEART RATE: 77 BPM | OXYGEN SATURATION: 98 % | SYSTOLIC BLOOD PRESSURE: 139 MMHG

## 2024-04-12 DIAGNOSIS — I25.10 CORONARY ARTERY DISEASE INVOLVING NATIVE CORONARY ARTERY OF NATIVE HEART WITHOUT ANGINA PECTORIS: ICD-10-CM

## 2024-04-12 DIAGNOSIS — I48.0 PAF (PAROXYSMAL ATRIAL FIBRILLATION): Primary | ICD-10-CM

## 2024-04-12 DIAGNOSIS — Z95.0 PRESENCE OF CARDIAC PACEMAKER: ICD-10-CM

## 2024-04-12 DIAGNOSIS — I38 VALVULAR HEART DISEASE: ICD-10-CM

## 2024-04-12 PROCEDURE — 99214 OFFICE O/P EST MOD 30 MIN: CPT | Performed by: INTERNAL MEDICINE

## 2024-04-12 PROCEDURE — 3075F SYST BP GE 130 - 139MM HG: CPT | Performed by: INTERNAL MEDICINE

## 2024-04-12 PROCEDURE — 93000 ELECTROCARDIOGRAM COMPLETE: CPT | Performed by: INTERNAL MEDICINE

## 2024-04-12 PROCEDURE — 1160F RVW MEDS BY RX/DR IN RCRD: CPT | Performed by: INTERNAL MEDICINE

## 2024-04-12 PROCEDURE — 3078F DIAST BP <80 MM HG: CPT | Performed by: INTERNAL MEDICINE

## 2024-04-12 PROCEDURE — 1159F MED LIST DOCD IN RCRD: CPT | Performed by: INTERNAL MEDICINE

## 2024-04-12 RX ORDER — MICONAZOLE NITRATE 20 MG/G
1 POWDER TOPICAL AS NEEDED
COMMUNITY
Start: 2024-03-20

## 2024-04-12 NOTE — ASSESSMENT & PLAN NOTE
Diagnosed in the past.  Device was checked with electrophysiology recently and did not have any A-fib events with implant.  Eliquis was stopped, patient started on aspirin for CAD.  On metoprolol 12.5 bid with infrequent but daily palpitations. Denies bleeding, but known historical chronic anemia with multiple iron infusions associated with her gastric surgery.  Last CBC was anemic.   Plan:  Follow-up device checks for atrial fibrillation occurrence  Continue aspirin for now  Revisit Watchman device option if recurrence of atrial fibrillation, especially that patient has valvular disease (MAC, aortic stenosis)

## 2024-04-12 NOTE — PROGRESS NOTES
[facility]  [facility]  [address] [place] [address]  Dept: [phone]  Dept Fax: [phone]    Date: 04/12/2024  Patient: Dorothy J Zawada  YOB: 1955    New Patient Office Note    Consult Reason:  Ms. Dorothy J Zawada is a 68 y.o. female who presents to the clinic to establish care, seen for Coronary Artery Disease, Cardiac Valve Problem, and Atrial Fibrillation.   Patient seen today doing fine with no complaints.  Infrequent palpitations on a day-to-day basis, patient attributing to atrial fibrillation events, lasting few seconds and resolving spontaneously.  Patient denies angina, orthopnea, PND, palpitations, lightheadedness, syncope or medications side-effects.    The following portions of the patient's history were reviewed and updated as appropriate: allergies, current medications, past family history, past medical history, past social history, past surgical history, and problem list.    Medications:   Allergies   Allergen Reactions    Claritin [Loratadine] Palpitations    Celebrex [Celecoxib] Unknown - Low Severity    Fish-Derived Products GI Intolerance and Nausea And Vomiting    Zocor [Simvastatin] Myalgia    Allegra [Fexofenadine] Palpitations    Pseudoephedrine Palpitations    Wool Alcohol [Lanolin] Rash    Zithromax [Azithromycin] Rash    Zyrtec [Cetirizine] Palpitations      Current Outpatient Medications   Medication Instructions    acetaminophen (TYLENOL) 500 mg, Oral, Every 6 Hours PRN    albuterol sulfate  (90 Base) MCG/ACT inhaler 2 puffs, Inhalation, Every 4 Hours PRN    ALPRAZolam (XANAX) 0.25 mg, Oral, Daily PRN    aspirin 81 mg, Oral, Daily    Cyanocobalamin (Vitamin B-12) 1000 MCG/15ML liquid Oral    Diclofenac Sodium 3 % gel gel Topical, 2 Times Daily, for 60 days.    diphenhydrAMINE-APAP, sleep, (TYLENOL PM EXTRA STRENGTH PO) 500 mg, Oral, Daily PRN, 1 to 2 tablets as needed    fluticasone (FLONASE) 50 MCG/ACT nasal  spray 2 sprays, Nasal, As Needed    folic acid (FOLVITE) 1 mg, Oral, Daily    glucose blood test strip E11.9, test once per day    glucose monitor monitoring kit 1 each, Does not apply, Daily, Dispense glucometer with brand based off insurance coverage    levothyroxine (SYNTHROID) 112 mcg, Oral, Daily    metFORMIN (GLUCOPHAGE) 500 mg, Oral, 2 Times Daily With Meals    metoprolol tartrate (LOPRESSOR) 12.5 mg, Oral, 2 Times Daily    mometasone (NASONEX) 50 MCG/ACT nasal spray 2 sprays, Nasal, Daily    nitroglycerin (NITROSTAT) 0.4 mg, Sublingual, Every 5 Minutes PRN, Take no more than 3 doses in 15 minutes.    pantoprazole (PROTONIX) 40 mg, Oral, Daily    rosuvastatin (CRESTOR) 20 mg, Oral, Daily    sertraline (ZOLOFT) 50 mg, Oral, Daily    SITagliptin (Januvia) 100 MG tablet By mouth take one tab daily.    tiZANidine (ZANAFLEX) 2 mg, Oral, Every 8 Hours PRN    vitamin D3 5,000 Units, Oral, Daily    Zeasorb-AF 2 % powder 1 Application, Topical, As Needed    zolpidem CR (AMBIEN CR) 6.25 mg, Oral, Nightly PRN    Zolpidem Tartrate 1.75 MG sublingual tablet 1 tablet, Sublingual, Nightly PRN       Subjective  Past Medical History:   Diagnosis Date    Anemia     Arthritis     ASHD (arteriosclerotic heart disease)     Carcinoma     Basal Cell/Squamous Cell    Coronary artery calcification     Diabetes insipidus     Diabetes mellitus     Disease of thyroid gland     Heart attack     Heart murmur     Hiatal hernia     History of blood transfusion     Hyperlipidemia     Hypertension     Hypothyroidism     Mantoux: positive     Non Hodgkin's lymphoma     Osteopenia     Plantar fasciitis     Type 2 diabetes mellitus     Ulcer of abdomen wall     Ulcers of both great toes     Vitamin D deficiency        Past Surgical History:   Procedure Laterality Date    ANGIOPLASTY      ANGIOPLASTY  2009    CARDIAC CATHETERIZATION      Stents     SECTION      DENTAL PROCEDURE      DILATATION AND CURETTAGE       ENDOSCOPY AND COLONOSCOPY  2016    FOOT SURGERY Left     GASTRIC BYPASS  2007    GASTRIC RESTRICTION SURGERY  See my chart    HERNIA REPAIR      KNEE SURGERY Left     Total Knee    LUNG REMOVAL, PARTIAL  2012    MOHS SURGERY N/A     Face and head    PACEMAKER IMPLANTATION      REPLACEMENT TOTAL KNEE Bilateral     TONSILLECTOMY         Family History   Problem Relation Age of Onset    Hyperlipidemia Mother     Heart attack Mother     Arthritis Mother     Heart disease Mother     Hypertension Mother             Diabetes Mother             Obesity Mother     Uterine cancer Mother     Hyperlipidemia Father     Arthritis Father     Kidney disease Father     Heart attack Father     Heart disease Father     Hypertension Father             Obesity Father     Hyperlipidemia Brother     Heart disease Brother     Arthritis Brother     Diabetes Brother             Hypertension Brother             Obesity Brother     Anxiety disorder Daughter     ADD / ADHD Daughter     Breast cancer Paternal Aunt     Tuberculosis Paternal Aunt     Cancer Paternal Aunt         Breast    Diabetes Maternal Grandmother             Colon cancer Maternal Grandmother     Cancer Maternal Grandmother         Colon    Heart disease Paternal Grandfather         Social History     Socioeconomic History    Marital status: Legally     Number of children: 2   Tobacco Use    Smoking status: Former     Current packs/day: 0.00     Average packs/day: 2.5 packs/day for 27.0 years (67.5 ttl pk-yrs)     Types: Cigarettes     Start date: 1969     Quit date: 1996     Years since quittin.2     Passive exposure: Past    Smokeless tobacco: Never   Vaping Use    Vaping status: Never Used   Substance and Sexual Activity    Alcohol use: Not Currently    Drug use: Never    Sexual activity: Not Currently       Objective  Vitals:    24 1037   BP: 139/78   Pulse: 77   Resp: 18   SpO2: 98%   Weight:  "84.8 kg (187 lb)   Height: 175.3 cm (69\")   PainSc: 0-No pain     Vitals:    04/12/24 1037   BP: 139/78   Pulse: 77   Resp: 18   SpO2: 98%   Weight: 84.8 kg (187 lb)   Height: 175.3 cm (69\")        Physical Exam  Constitutional:       Appearance: Not in distress.   Neck:      Vascular: JVD normal.   Pulmonary:      Breath sounds: Normal breath sounds.   Cardiovascular:      Normal rate. Regular rhythm.      Murmurs: There is no murmur.   Pulses:     Intact distal pulses.   Edema:     Peripheral edema absent.   Neurological:      Mental Status: Alert.              Labs:  Lab Results   Component Value Date     07/13/2023    K 4.2 07/13/2023     07/13/2023    CO2 25.5 07/13/2023    MG 2.0 07/13/2023    BUN 13 07/13/2023    CREATININE 0.90 07/13/2023    CALCIUM 9.5 07/13/2023    BILITOT 0.4 07/13/2023    ALKPHOS 68 07/13/2023    ALT 5 07/13/2023    AST 22 07/13/2023    GLUCOSE 122 (H) 07/13/2023    ALBUMIN 3.9 07/13/2023     Lab Results   Component Value Date    WBC 8.56 12/21/2023    HGB 11.4 (L) 12/21/2023    HCT 35.5 12/21/2023     12/21/2023     No results found for: \"APTT\", \"INR\", \"PTT\"  No results found for: \"CKTOTAL\", \"TROPONINI\", \"TROPONINT\", \"CKMBINDEX\", \"BNP\"  No results found for: \"BNP\", \"PROBNP\"    Lab Results   Component Value Date    CHLPL 132 05/08/2023    TRIG 150 (H) 05/08/2023    HDL 57 05/08/2023    LDL 50 05/08/2023     Lab Results   Component Value Date    TSH 0.097 (L) 10/12/2023    FREET4 1.75 10/12/2023       The ASCVD Risk score (She DK, et al., 2019) failed to calculate for the following reasons:    The patient has a prior MI or stroke diagnosis     CV Diagnostics:    ECG 12 Lead    Date/Time: 4/12/2024 11:09 AM  Performed by: Abel Aragon MD    Authorized by: Abel Aragon MD  Comparison: compared with previous ECG from 12/14/2023  Similar to previous ECG  Rhythm: paced  Conduction: right bundle branch block  Comments: Atrial pacing with right bundle branch block " intact AV node conduction          CXR: No results found for this or any previous visit.     ECHO/MUGA: Results for orders placed in visit on 07/24/23    Adult Transthoracic Echo Complete W/ Cont if Necessary Per Protocol    Interpretation Summary    Left ventricular systolic function is normal. Calculated left ventricular EF = 65% Left ventricular ejection fraction appears to be 61 - 65%.    Left ventricular diastolic function is consistent with (grade I) impaired relaxation.    The left atrial cavity is mildly dilated.    Mild aortic valve stenosis is present. Aortic valve area is 2 cm2.Moderate thickening and calcification of the aortic valve    Peak velocity of the flow distal to the aortic valve is 230 cm/s. Aortic valve maximum pressure gradient is 20 mmHg. Aortic valve mean pressure gradient is 11 mmHg.    The mitral valve peak gradient is 6 mmHg. The mitral valve mean gradient is 2 mmHg. The mitral valve area (PHT) is 3.2 cm2. Moderate MAC- Mild MR    Estimated right ventricular systolic pressure from tricuspid regurgitation is markedly elevated (>55 mmHg). Calculated right ventricular systolic pressure from tricuspid regurgitation is 58 mmHg.    The aortic root measures 2.8 cm.    Normal Rv size and function    No pericardial effusion     STRESS TESTS: No results found for this or any previous visit.     CARDIAC CATH: No results found for this or any previous visit.     DEVICES: No valid procedures specified.   HOLTER: No results found for this or any previous visit.     CT/MRI:  No results found for this or any previous visit.    VASCULAR: No valid procedures specified.     Assessment and Plan  Diagnoses and all orders for this visit:    1. PAF (paroxysmal atrial fibrillation) (Primary)  Assessment & Plan:  Diagnosed in the past.  Device was checked with electrophysiology recently and did not have any A-fib events with implant.  Eliquis was stopped, patient started on aspirin for CAD.  On metoprolol 12.5  bid with infrequent but daily palpitations. Denies bleeding, but known historical chronic anemia with multiple iron infusions associated with her gastric surgery.  Last CBC was anemic.   Plan:  Follow-up device checks for atrial fibrillation occurrence  Continue aspirin for now  Revisit Watchman device option if recurrence of atrial fibrillation, especially that patient has valvular disease (MAC, aortic stenosis)    Orders:  -     ECG 12 Lead    2. Presence of cardiac pacemaker  Assessment & Plan:  Dual-chamber, Medtronic pacemaker.  Atrial pacing V sensing.  Device check next visit.      3. Coronary artery disease involving native coronary artery of native heart without angina pectoris  Assessment & Plan:  Multiple stents, last to LAD, cath from 2014 showed LAD 50% in stent restenosis, 3rd proximal to mid Dx 40%. Asymptomatic.  Patient on aspirin 81 mg daily and statin, as well as low-dose metoprolol.  Last lipid profile to goal.  Blood pressure to target overall. Plan:  Continue optimal therapy  Monitor for chest pains      4. Valvular heart disease  Assessment & Plan:  Moderate MAC, mild MR and mild aortic stenosis.  Follow-up echo in 2 to 3 years, last 7/24/2023.           Return in about 1 month (around 5/12/2024) for Follow-up with Dr Aragon for pacemaker check Medtronic.    There are no Patient Instructions on file for this visit.    Abel Aragon MD

## 2024-04-17 ENCOUNTER — TELEPHONE (OUTPATIENT)
Dept: OBSTETRICS AND GYNECOLOGY | Age: 69
End: 2024-04-17
Payer: MEDICARE

## 2024-04-26 ENCOUNTER — TELEPHONE (OUTPATIENT)
Dept: ONCOLOGY | Facility: CLINIC | Age: 69
End: 2024-04-26
Payer: MEDICARE

## 2024-04-26 ENCOUNTER — LAB (OUTPATIENT)
Dept: OTHER | Facility: HOSPITAL | Age: 69
End: 2024-04-26
Payer: MEDICARE

## 2024-04-26 DIAGNOSIS — T45.4X5A ADVERSE EFFECT OF PREPARATION OF IRON COMPOUND, INITIAL ENCOUNTER: ICD-10-CM

## 2024-04-26 DIAGNOSIS — D50.8 IRON DEFICIENCY ANEMIA AFTER GASTRECTOMY: Primary | ICD-10-CM

## 2024-04-26 DIAGNOSIS — E03.9 HYPOTHYROIDISM, UNSPECIFIED TYPE: ICD-10-CM

## 2024-04-26 DIAGNOSIS — E11.65 TYPE 2 DIABETES MELLITUS WITH HYPERGLYCEMIA, WITHOUT LONG-TERM CURRENT USE OF INSULIN: ICD-10-CM

## 2024-04-26 DIAGNOSIS — K91.89 IRON DEFICIENCY ANEMIA AFTER GASTRECTOMY: Primary | ICD-10-CM

## 2024-04-26 LAB
BASOPHILS # BLD AUTO: 0.07 10*3/MM3 (ref 0–0.2)
BASOPHILS NFR BLD AUTO: 1 % (ref 0–1.5)
DEPRECATED RDW RBC AUTO: 43.8 FL (ref 37–54)
EOSINOPHIL # BLD AUTO: 0.29 10*3/MM3 (ref 0–0.4)
EOSINOPHIL NFR BLD AUTO: 4.2 % (ref 0.3–6.2)
ERYTHROCYTE [DISTWIDTH] IN BLOOD BY AUTOMATED COUNT: 12.7 % (ref 12.3–15.4)
FERRITIN SERPL-MCNC: 275 NG/ML (ref 13–150)
HCT VFR BLD AUTO: 35.3 % (ref 34–46.6)
HGB BLD-MCNC: 11.4 G/DL (ref 12–15.9)
IMM GRANULOCYTES # BLD AUTO: 0.01 10*3/MM3 (ref 0–0.05)
IMM GRANULOCYTES NFR BLD AUTO: 0.1 % (ref 0–0.5)
IRON 24H UR-MRATE: 80 MCG/DL (ref 37–145)
IRON SATN MFR SERPL: 22 % (ref 20–50)
LYMPHOCYTES # BLD AUTO: 1.78 10*3/MM3 (ref 0.7–3.1)
LYMPHOCYTES NFR BLD AUTO: 25.9 % (ref 19.6–45.3)
MCH RBC QN AUTO: 30.2 PG (ref 26.6–33)
MCHC RBC AUTO-ENTMCNC: 32.3 G/DL (ref 31.5–35.7)
MCV RBC AUTO: 93.6 FL (ref 79–97)
MONOCYTES # BLD AUTO: 0.48 10*3/MM3 (ref 0.1–0.9)
MONOCYTES NFR BLD AUTO: 7 % (ref 5–12)
NEUTROPHILS NFR BLD AUTO: 4.23 10*3/MM3 (ref 1.7–7)
NEUTROPHILS NFR BLD AUTO: 61.8 % (ref 42.7–76)
NRBC BLD AUTO-RTO: 0 /100 WBC (ref 0–0.2)
PLATELET # BLD AUTO: 242 10*3/MM3 (ref 140–450)
PMV BLD AUTO: 9.2 FL (ref 6–12)
RBC # BLD AUTO: 3.77 10*6/MM3 (ref 3.77–5.28)
TIBC SERPL-MCNC: 361 MCG/DL (ref 298–536)
TRANSFERRIN SERPL-MCNC: 242 MG/DL (ref 200–360)
WBC NRBC COR # BLD AUTO: 6.86 10*3/MM3 (ref 3.4–10.8)

## 2024-04-26 PROCEDURE — 36415 COLL VENOUS BLD VENIPUNCTURE: CPT

## 2024-04-26 PROCEDURE — 85025 COMPLETE CBC W/AUTO DIFF WBC: CPT | Performed by: INTERNAL MEDICINE

## 2024-04-26 PROCEDURE — 84466 ASSAY OF TRANSFERRIN: CPT | Performed by: INTERNAL MEDICINE

## 2024-04-26 PROCEDURE — 83540 ASSAY OF IRON: CPT | Performed by: INTERNAL MEDICINE

## 2024-04-26 PROCEDURE — 82728 ASSAY OF FERRITIN: CPT | Performed by: INTERNAL MEDICINE

## 2024-04-26 NOTE — TELEPHONE ENCOUNTER
Attempted to call no answer left message.  Patient arrived early this morning stating she thinks she is iron deficient due to being short of breath.  Patient's Ferritin 275.0, Iron Sat 22 and HgB 11.8. Patient is not iron deficient. Left message patient needs to follow up with PCP or Cardiology for sortness of breath.

## 2024-05-03 DIAGNOSIS — E03.9 HYPOTHYROIDISM, UNSPECIFIED TYPE: ICD-10-CM

## 2024-05-03 RX ORDER — LEVOTHYROXINE SODIUM 112 UG/1
112 TABLET ORAL DAILY
Qty: 90 TABLET | Refills: 3 | Status: SHIPPED | OUTPATIENT
Start: 2024-05-03

## 2024-05-17 DIAGNOSIS — E11.9 TYPE 2 DIABETES MELLITUS WITHOUT COMPLICATION, WITHOUT LONG-TERM CURRENT USE OF INSULIN: ICD-10-CM

## 2024-05-17 DIAGNOSIS — G47.00 INSOMNIA, UNSPECIFIED TYPE: ICD-10-CM

## 2024-05-17 DIAGNOSIS — F41.9 ANXIETY: ICD-10-CM

## 2024-05-17 RX ORDER — ALPRAZOLAM 0.5 MG/1
0.25 TABLET ORAL DAILY PRN
Qty: 15 TABLET | Refills: 1 | Status: SHIPPED | OUTPATIENT
Start: 2024-05-17

## 2024-06-11 DIAGNOSIS — R53.83 FATIGUE, UNSPECIFIED TYPE: ICD-10-CM

## 2024-06-11 DIAGNOSIS — E78.5 HYPERLIPIDEMIA, UNSPECIFIED HYPERLIPIDEMIA TYPE: ICD-10-CM

## 2024-06-11 DIAGNOSIS — R25.2 BILATERAL LEG CRAMPS: ICD-10-CM

## 2024-06-11 DIAGNOSIS — E11.9 TYPE 2 DIABETES MELLITUS WITHOUT COMPLICATION, WITHOUT LONG-TERM CURRENT USE OF INSULIN: ICD-10-CM

## 2024-06-11 DIAGNOSIS — I10 PRIMARY HYPERTENSION: ICD-10-CM

## 2024-06-11 DIAGNOSIS — M79.10 MUSCLE ACHE: ICD-10-CM

## 2024-06-11 RX ORDER — FOLIC ACID 1 MG/1
1 TABLET ORAL DAILY
Qty: 90 TABLET | Refills: 3 | Status: SHIPPED | OUTPATIENT
Start: 2024-06-11

## 2024-06-11 RX ORDER — TIZANIDINE 2 MG/1
2 TABLET ORAL EVERY 8 HOURS PRN
Qty: 90 TABLET | Refills: 3 | Status: SHIPPED | OUTPATIENT
Start: 2024-06-11

## 2024-06-11 RX ORDER — ROSUVASTATIN CALCIUM 20 MG/1
20 TABLET, COATED ORAL DAILY
Qty: 90 TABLET | Refills: 3 | Status: SHIPPED | OUTPATIENT
Start: 2024-06-11

## 2024-07-12 ENCOUNTER — OFFICE VISIT (OUTPATIENT)
Dept: FAMILY MEDICINE CLINIC | Facility: CLINIC | Age: 69
End: 2024-07-12
Payer: MEDICARE

## 2024-07-12 VITALS
BODY MASS INDEX: 27.73 KG/M2 | OXYGEN SATURATION: 98 % | TEMPERATURE: 98.6 F | HEIGHT: 69 IN | WEIGHT: 187.2 LBS | SYSTOLIC BLOOD PRESSURE: 122 MMHG | HEART RATE: 87 BPM | DIASTOLIC BLOOD PRESSURE: 72 MMHG

## 2024-07-12 DIAGNOSIS — G47.00 INSOMNIA, UNSPECIFIED TYPE: ICD-10-CM

## 2024-07-12 DIAGNOSIS — E11.9 TYPE 2 DIABETES MELLITUS WITHOUT COMPLICATION, WITHOUT LONG-TERM CURRENT USE OF INSULIN: ICD-10-CM

## 2024-07-12 DIAGNOSIS — F41.9 ANXIETY: ICD-10-CM

## 2024-07-12 DIAGNOSIS — J06.9 UPPER RESPIRATORY TRACT INFECTION, UNSPECIFIED TYPE: Primary | ICD-10-CM

## 2024-07-12 RX ORDER — ZOLPIDEM TARTRATE 1.75 MG/1
1 TABLET SUBLINGUAL NIGHTLY PRN
Qty: 30 TABLET | Refills: 3 | Status: SHIPPED | OUTPATIENT
Start: 2024-07-12

## 2024-07-12 RX ORDER — ZOLPIDEM TARTRATE 6.25 MG/1
6.25 TABLET, FILM COATED, EXTENDED RELEASE ORAL NIGHTLY PRN
Qty: 90 TABLET | Refills: 1 | Status: SHIPPED | OUTPATIENT
Start: 2024-07-12

## 2024-07-12 RX ORDER — ALPRAZOLAM 0.5 MG/1
0.25 TABLET ORAL DAILY PRN
Qty: 15 TABLET | Refills: 1 | Status: SHIPPED | OUTPATIENT
Start: 2024-07-12

## 2024-07-12 RX ORDER — ALBUTEROL SULFATE 90 UG/1
2 AEROSOL, METERED RESPIRATORY (INHALATION) EVERY 4 HOURS PRN
Qty: 18 G | Refills: 3 | Status: SHIPPED | OUTPATIENT
Start: 2024-07-12

## 2024-07-12 NOTE — PROGRESS NOTES
"Chief Complaint  Anxiety, Insomnia, cough, nasal congestion    Subjective        Dorothy Joan Zawada presents to [facility]  History of Present Illness  History of Present Illness  Lissette is an established patient presenting for nasal congestion, cough, wheezing, and for follow-up for insomnia and anxiety.  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 Family history of breast cancer in aunt, and grandmother with colon cancer. She is a former smoker.  She follows with multiple specialists including endocrinology, cardiology, hematology, orthopedic surgery.  Discontinued Eliquis after 12/14/2023 clinic visit with Niko Page MD, electrophysiologist (note reviewed).  Will be moving to [place] in 1 week.     She has been utilizing an albuterol inhaler every 4 hours for an upper respiratory infection. She sought care at an urgent care facility last Saturday (note reviewed), where she was prescribed an antibiotic for a duration of 7 days. Despite this, she continues to experience a productive cough with green sputum, wheezing, and significant congestion. Her sore throat has resolved. She last used her albuterol inhaler this morning, which she reports as beneficial. She has found NyQuil with VapoRub to open up her nasal passages. She was tested for COVID-19, influenza, and strep, all of which returned negative results.     She reports doing well on her current Zoloft, Ambien, and Xanax for anxiety and sleep.  Denies any side effects.    Objective   Vital Signs:  /72   Pulse 87   Temp 98.6 °F (37 °C)   Ht 175.3 cm (69\")   Wt 84.9 kg (187 lb 3.2 oz)   SpO2 98%   BMI 27.64 kg/m²   Estimated body mass index is 27.64 kg/m² as calculated from the following:    Height as of this encounter: 175.3 cm (69\").    Weight as of " this encounter: 84.9 kg (187 lb 3.2 oz).             Physical Exam  Vitals and nursing note reviewed.   Constitutional:       General: She is not in acute distress.     Appearance: Normal appearance.   HENT:      Head: Normocephalic and atraumatic.   Cardiovascular:      Rate and Rhythm: Normal rate and regular rhythm.      Heart sounds: Normal heart sounds. No murmur heard.     No friction rub. No gallop.   Pulmonary:      Effort: Pulmonary effort is normal.      Breath sounds: Normal breath sounds. No wheezing, rhonchi or rales.   Abdominal:      General: Bowel sounds are normal.      Palpations: Abdomen is soft.   Musculoskeletal:      Right lower leg: No edema.      Left lower leg: No edema.   Skin:     General: Skin is warm and dry.   Neurological:      General: No focal deficit present.      Mental Status: She is alert. Mental status is at baseline.   Psychiatric:         Mood and Affect: Mood normal.         Behavior: Behavior normal.        Physical Exam  No wheezing or crackles in the lungs.    Result Review :          Results  Laboratory Studies  Covid, flu, and strep tests were negative.             Assessment and Plan     Diagnoses and all orders for this visit:    1. Upper respiratory tract infection, unspecified type (Primary)  -     albuterol sulfate  (90 Base) MCG/ACT inhaler; Inhale 2 puffs Every 4 (Four) Hours As Needed for Wheezing.  Dispense: 18 g; Refill: 3    2. Anxiety  -     ALPRAZolam (Xanax) 0.5 MG tablet; Take 0.5 tablets by mouth Daily As Needed for Anxiety.  Dispense: 15 tablet; Refill: 1  -     sertraline (Zoloft) 50 MG tablet; Take 1 tablet by mouth Daily.  Dispense: 90 tablet; Refill: 3    3. Insomnia, unspecified type  -     ALPRAZolam (Xanax) 0.5 MG tablet; Take 0.5 tablets by mouth Daily As Needed for Anxiety.  Dispense: 15 tablet; Refill: 1  -     Zolpidem Tartrate 1.75 MG sublingual tablet; Place 1 tablet under the tongue At Night As Needed (nighttime awakening).   Dispense: 30 tablet; Refill: 3  -     zolpidem CR (AMBIEN CR) 6.25 MG CR tablet; Take 1 tablet by mouth At Night As Needed for Sleep.  Dispense: 90 tablet; Refill: 1    4. Type 2 diabetes mellitus without complication, without long-term current use of insulin  -     SITagliptin (Januvia) 100 MG tablet; By mouth take one tab daily.  Dispense: 90 tablet; Refill: 3      Assessment & Plan  1. Upper respiratory infection.  The patient's condition has shown improvement. Prescriptions for albuterol refill was sent since patient continues to notice wheezing and nighttime coughing.  Feels her mother symptoms are well-managed with current regimen.  Precautions given for worsening symptoms.    2.  Anxiety/insomnia.  Continue current Xanax, Zoloft, and Ambien  - Continuing Xanax, Ambien therapy, tolerating well, good benefit  - Reviewed side effects of medication including dizziness, confusion, drowsiness, constipation, memory loss, headache, muscle aches.  - Pt tolerating well without any issues.   - Reviewed controlled nature of substance, potential for abuse/addiction, and possible adverse effects of medication.  - Controlled substances contract signed  - Annual UDS screening done  - Pepe checked and appropriate.          Follow Up     Return if symptoms worsen or fail to improve.  Patient was given instructions and counseling regarding her condition or for health maintenance advice. Please see specific information pulled into the AVS if appropriate.     Patient or patient representative verbalized consent for the use of Ambient Listening during the visit with  Lenin Rashid MD for chart documentation. 7/13/2024  09:47 EDT

## 2024-09-24 ENCOUNTER — TELEPHONE (OUTPATIENT)
Dept: FAMILY MEDICINE CLINIC | Facility: CLINIC | Age: 69
End: 2024-09-24
Payer: MEDICARE